# Patient Record
Sex: MALE | Race: WHITE
[De-identification: names, ages, dates, MRNs, and addresses within clinical notes are randomized per-mention and may not be internally consistent; named-entity substitution may affect disease eponyms.]

---

## 2018-08-21 ENCOUNTER — HOSPITAL ENCOUNTER (EMERGENCY)
Dept: HOSPITAL 15 - ER | Age: 68
Discharge: ANOTHER HOSPITAL | End: 2018-08-21
Payer: MEDICARE

## 2018-08-21 VITALS — HEIGHT: 72 IN | BODY MASS INDEX: 28.44 KG/M2 | WEIGHT: 210 LBS

## 2018-08-21 VITALS — SYSTOLIC BLOOD PRESSURE: 162 MMHG | DIASTOLIC BLOOD PRESSURE: 98 MMHG

## 2018-08-21 DIAGNOSIS — F10.10: ICD-10-CM
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
## 2018-08-21 DIAGNOSIS — J44.9: ICD-10-CM

## 2018-08-21 DIAGNOSIS — S12.110A: Primary | ICD-10-CM
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
## 2018-08-21 LAB
ALBUMIN SERPL-MCNC: 3.4 G/DL (ref 3.4–5)
ALP SERPL-CCNC: 55 U/L (ref 45–117)
ALT SERPL-CCNC: 90 U/L (ref 16–61)
ANION GAP SERPL CALCULATED.3IONS-SCNC: 12 MMOL/L (ref 5–15)
APTT PPP: 26 SEC (ref 23.78–33.04)
BILIRUB SERPL-MCNC: 0.6 MG/DL (ref 0.2–1)
BUN SERPL-MCNC: 13 MG/DL (ref 7–18)
BUN/CREAT SERPL: 14.6
CALCIUM SERPL-MCNC: 8.7 MG/DL (ref 8.5–10.1)
CHLORIDE SERPL-SCNC: 108 MMOL/L (ref 98–107)
CO2 SERPL-SCNC: 25 MMOL/L (ref 21–32)
GLUCOSE SERPL-MCNC: 104 MG/DL (ref 74–106)
HCT VFR BLD AUTO: 46.5 % (ref 41–53)
HGB BLD-MCNC: 15.9 G/DL (ref 13.5–17.5)
INR PPP: 0.94 (ref 0.9–1.15)
MAGNESIUM SERPL-MCNC: 1.8 MG/DL (ref 1.6–2.6)
MCH RBC QN AUTO: 37.9 PG (ref 28–32)
MCV RBC AUTO: 110.8 FL (ref 80–100)
NRBC BLD QL AUTO: 0.2 %
POTASSIUM SERPL-SCNC: 3.3 MMOL/L (ref 3.5–5.1)
PROT SERPL-MCNC: 6.9 G/DL (ref 6.4–8.2)
PROTHROMBIN TIME: 10.1 SEC (ref 9.27–12.13)
SODIUM SERPL-SCNC: 145 MMOL/L (ref 136–145)

## 2018-08-21 PROCEDURE — 96375 TX/PRO/DX INJ NEW DRUG ADDON: CPT

## 2018-08-21 PROCEDURE — 80320 DRUG SCREEN QUANTALCOHOLS: CPT

## 2018-08-21 PROCEDURE — 70450 CT HEAD/BRAIN W/O DYE: CPT

## 2018-08-21 PROCEDURE — 83880 ASSAY OF NATRIURETIC PEPTIDE: CPT

## 2018-08-21 PROCEDURE — 72125 CT NECK SPINE W/O DYE: CPT

## 2018-08-21 PROCEDURE — 83735 ASSAY OF MAGNESIUM: CPT

## 2018-08-21 PROCEDURE — 85025 COMPLETE CBC W/AUTO DIFF WBC: CPT

## 2018-08-21 PROCEDURE — 36415 COLL VENOUS BLD VENIPUNCTURE: CPT

## 2018-08-21 PROCEDURE — 85610 PROTHROMBIN TIME: CPT

## 2018-08-21 PROCEDURE — 99285 EMERGENCY DEPT VISIT HI MDM: CPT

## 2018-08-21 PROCEDURE — 85379 FIBRIN DEGRADATION QUANT: CPT

## 2018-08-21 PROCEDURE — 84484 ASSAY OF TROPONIN QUANT: CPT

## 2018-08-21 PROCEDURE — 96374 THER/PROPH/DIAG INJ IV PUSH: CPT

## 2018-08-21 PROCEDURE — 85730 THROMBOPLASTIN TIME PARTIAL: CPT

## 2018-08-21 PROCEDURE — 96372 THER/PROPH/DIAG INJ SC/IM: CPT

## 2018-08-21 PROCEDURE — 80053 COMPREHEN METABOLIC PANEL: CPT

## 2018-08-21 PROCEDURE — 71045 X-RAY EXAM CHEST 1 VIEW: CPT

## 2019-10-25 ENCOUNTER — HOSPITAL ENCOUNTER (INPATIENT)
Dept: HOSPITAL 15 - ER | Age: 69
LOS: 1 days | Discharge: HOME | DRG: 202 | End: 2019-10-26
Attending: INTERNAL MEDICINE | Admitting: INTERNAL MEDICINE
Payer: COMMERCIAL

## 2019-10-25 VITALS — SYSTOLIC BLOOD PRESSURE: 164 MMHG | DIASTOLIC BLOOD PRESSURE: 83 MMHG

## 2019-10-25 VITALS — BODY MASS INDEX: 28.43 KG/M2 | WEIGHT: 209.88 LBS | HEIGHT: 72 IN

## 2019-10-25 VITALS — SYSTOLIC BLOOD PRESSURE: 178 MMHG | DIASTOLIC BLOOD PRESSURE: 95 MMHG

## 2019-10-25 VITALS — DIASTOLIC BLOOD PRESSURE: 82 MMHG | SYSTOLIC BLOOD PRESSURE: 157 MMHG

## 2019-10-25 DIAGNOSIS — J20.9: Primary | ICD-10-CM

## 2019-10-25 DIAGNOSIS — J44.0: ICD-10-CM

## 2019-10-25 DIAGNOSIS — Z86.73: ICD-10-CM

## 2019-10-25 DIAGNOSIS — E66.3: ICD-10-CM

## 2019-10-25 DIAGNOSIS — F17.210: ICD-10-CM

## 2019-10-25 DIAGNOSIS — I50.9: ICD-10-CM

## 2019-10-25 DIAGNOSIS — J44.1: ICD-10-CM

## 2019-10-25 DIAGNOSIS — H35.30: ICD-10-CM

## 2019-10-25 DIAGNOSIS — Z79.899: ICD-10-CM

## 2019-10-25 LAB
ALBUMIN SERPL-MCNC: 3.9 G/DL (ref 3.4–5)
ALP SERPL-CCNC: 69 U/L (ref 45–117)
ALT SERPL-CCNC: 29 U/L (ref 16–61)
ANION GAP SERPL CALCULATED.3IONS-SCNC: 5 MMOL/L (ref 5–15)
BILIRUB SERPL-MCNC: 0.5 MG/DL (ref 0.2–1)
BUN SERPL-MCNC: 26 MG/DL (ref 7–18)
BUN/CREAT SERPL: 19.8
CALCIUM SERPL-MCNC: 8.5 MG/DL (ref 8.5–10.1)
CHLORIDE SERPL-SCNC: 104 MMOL/L (ref 98–107)
CO2 SERPL-SCNC: 31 MMOL/L (ref 21–32)
GLUCOSE SERPL-MCNC: 127 MG/DL (ref 74–106)
HCT VFR BLD AUTO: 51.4 % (ref 41–53)
HGB BLD-MCNC: 17.1 G/DL (ref 13.5–17.5)
MCH RBC QN AUTO: 36.2 PG (ref 28–32)
MCV RBC AUTO: 108.9 FL (ref 80–100)
NRBC BLD QL AUTO: 0.1 %
POTASSIUM SERPL-SCNC: 4.5 MMOL/L (ref 3.5–5.1)
PROT SERPL-MCNC: 7.6 G/DL (ref 6.4–8.2)
SODIUM SERPL-SCNC: 140 MMOL/L (ref 136–145)

## 2019-10-25 PROCEDURE — 96365 THER/PROPH/DIAG IV INF INIT: CPT

## 2019-10-25 PROCEDURE — 83880 ASSAY OF NATRIURETIC PEPTIDE: CPT

## 2019-10-25 PROCEDURE — 81001 URINALYSIS AUTO W/SCOPE: CPT

## 2019-10-25 PROCEDURE — 80053 COMPREHEN METABOLIC PANEL: CPT

## 2019-10-25 PROCEDURE — 71045 X-RAY EXAM CHEST 1 VIEW: CPT

## 2019-10-25 PROCEDURE — 94640 AIRWAY INHALATION TREATMENT: CPT

## 2019-10-25 PROCEDURE — 82550 ASSAY OF CK (CPK): CPT

## 2019-10-25 PROCEDURE — 36415 COLL VENOUS BLD VENIPUNCTURE: CPT

## 2019-10-25 PROCEDURE — 85025 COMPLETE CBC W/AUTO DIFF WBC: CPT

## 2019-10-25 PROCEDURE — 84484 ASSAY OF TROPONIN QUANT: CPT

## 2019-10-25 PROCEDURE — 93005 ELECTROCARDIOGRAM TRACING: CPT

## 2019-10-25 PROCEDURE — 96375 TX/PRO/DX INJ NEW DRUG ADDON: CPT

## 2019-10-25 RX ADMIN — LEVOFLOXACIN SCH MLS/HR: 5 INJECTION, SOLUTION INTRAVENOUS at 17:28

## 2019-10-25 RX ADMIN — CARVEDILOL SCH MG: 3.12 TABLET, FILM COATED ORAL at 21:48

## 2019-10-25 RX ADMIN — METHYLPREDNISOLONE SODIUM SUCCINATE SCH MG: 40 INJECTION, POWDER, FOR SOLUTION INTRAMUSCULAR; INTRAVENOUS at 21:49

## 2019-10-25 RX ADMIN — FUROSEMIDE SCH MG: 10 INJECTION, SOLUTION INTRAMUSCULAR; INTRAVENOUS at 17:45

## 2019-10-25 RX ADMIN — ALBUTEROL SULFATE SCH MG: 2.5 SOLUTION RESPIRATORY (INHALATION) at 19:26

## 2019-10-25 RX ADMIN — NITROGLYCERIN SCH PATCH: 0.2 PATCH TRANSDERMAL at 17:45

## 2019-10-25 RX ADMIN — IPRATROPIUM BROMIDE SCH MG: 0.5 SOLUTION RESPIRATORY (INHALATION) at 19:27

## 2019-10-25 RX ADMIN — ENALAPRIL MALEATE SCH MG: 2.5 TABLET ORAL at 17:45

## 2019-10-25 RX ADMIN — POTASSIUM CHLORIDE SCH MEQ: 1500 TABLET, EXTENDED RELEASE ORAL at 17:45

## 2019-10-25 NOTE — NUR
RT NOTE

PT WAS SEEN BY RT FOR HHN TX. PT TOLERATES WELL  VIA MASK. NO ADVERSE REACTION NOTED. CONT 
AS ORDERED

-------------------------------------------------------------------------------

Addendum: 10/25/19 at 1929 by Marie Kemp RT

-------------------------------------------------------------------------------

Amended: Links added.

## 2019-10-25 NOTE — NUR
Telemetry admit from JASMINE DANIELSMILENA admitted to Telemetry unit after SBAR received.  Patient oriented to RENALDO PALAFOX, primary RN, unit, room, bed, and unit policies regarding patient care and 
visiting hours. Patient now on continuous telemetry monitoring, tele box # 61. Patient 
placed on bedside oxygen 3 L NC, and encouraged to call if they need something. All 
questions and concerns addressed, patient verbalized understanding. 

Vitals: /95, HR 76, RR 20, 02 96, 98.2, 0/10 P. Patient reports no sob at this time, 
will continue to monitor.


-------------------------------------------------------------------------------

Addendum: 10/26/19 at 0714 by RENALDO PALAFOX RN

-------------------------------------------------------------------------------

Pt reports he has not had flu or pneumonia vaccine in the last 5 years.

## 2019-10-25 NOTE — NUR
ENDORSED CARE TO NIGHT MAGDY ARIAS. JUANA NOTIFIED OF PT BP, URINALYSIS AND RESPIRATORY 
CULTURE NEEDED, AND LEVAQUIN NEEDS TO BE RESTARTED.

## 2019-10-26 VITALS — SYSTOLIC BLOOD PRESSURE: 146 MMHG | DIASTOLIC BLOOD PRESSURE: 77 MMHG

## 2019-10-26 VITALS — SYSTOLIC BLOOD PRESSURE: 163 MMHG | DIASTOLIC BLOOD PRESSURE: 94 MMHG

## 2019-10-26 VITALS — DIASTOLIC BLOOD PRESSURE: 112 MMHG | SYSTOLIC BLOOD PRESSURE: 129 MMHG

## 2019-10-26 RX ADMIN — LEVOFLOXACIN SCH MLS/HR: 5 INJECTION, SOLUTION INTRAVENOUS at 10:00

## 2019-10-26 RX ADMIN — ALBUTEROL SULFATE SCH MG: 2.5 SOLUTION RESPIRATORY (INHALATION) at 06:24

## 2019-10-26 RX ADMIN — IPRATROPIUM BROMIDE SCH MG: 0.5 SOLUTION RESPIRATORY (INHALATION) at 06:24

## 2019-10-26 RX ADMIN — POTASSIUM CHLORIDE SCH MEQ: 1500 TABLET, EXTENDED RELEASE ORAL at 10:07

## 2019-10-26 RX ADMIN — NITROGLYCERIN SCH PATCH: 0.2 PATCH TRANSDERMAL at 10:09

## 2019-10-26 RX ADMIN — FUROSEMIDE SCH MG: 10 INJECTION, SOLUTION INTRAMUSCULAR; INTRAVENOUS at 10:00

## 2019-10-26 RX ADMIN — METHYLPREDNISOLONE SODIUM SUCCINATE SCH MG: 40 INJECTION, POWDER, FOR SOLUTION INTRAMUSCULAR; INTRAVENOUS at 10:00

## 2019-10-26 RX ADMIN — ENALAPRIL MALEATE SCH MG: 2.5 TABLET ORAL at 10:07

## 2019-10-26 RX ADMIN — CARVEDILOL SCH MG: 3.12 TABLET, FILM COATED ORAL at 10:07

## 2019-10-26 NOTE — NUR
Discharge instructions given as ordered. Encourage to follow up with PMD as instructed. All 
questions and concerns addressed. Patient verbalized understanding.  Medication 
reconciliation form completed and copy given to patient. Needed vaccines given. IV removed 
with catheter intact, pressure dressing applied.  Telemetry unit returned to ICU. Patient 
taken to vehicle via wheelchair with all personal belongings, accompanied by staff. No 
distress noted at time of departure. 

-------------------------------------------------------------------------------

Addendum: 10/26/19 at 1300 by RENALDO PALAFOX RN

-------------------------------------------------------------------------------

Pt refused social service consult. He reports he is in a hurry to go.

## 2019-10-26 NOTE — NUR
RT NOTE

PT WAS SEEN BY RT FOR HHN TX. PT WAS SLEEPING BUT EASILY AWAKENED. PT STATES NO TREATMENT IS 
NEEDED AT THIS TIME AND WILL TAKE ONE AGAIN IN  THE MORNING. SITTER AT BEDSIDE WILL CALL IF 
TX NEEDED. HR 86, RR 16, BS CLEAR/DIM, POX 94% ON 2L NASAL CANNULA. MAGDY ARIAS NOTIFIED AND 
WILL CALL IF TX NEEDED BEFORE NEXT ROUNDS

-------------------------------------------------------------------------------

Addendum: 10/26/19 at 0056 by Marie Kemp RT

-------------------------------------------------------------------------------

Amended: Links added.

## 2019-10-26 NOTE — NUR
Pt reports he has to leave by 11 am. Pt reports he will not stay any longer. He reports,"I 
have to try and stop my house from foreclosing today, I have until 4 pm." Pt requesting taxi 
voucher. Encouraged pt to stay and wait for doctor. Pt refusing. Dr. Michael notified and 
aware. Called house supervisor, no answer, will try again.

## 2019-10-26 NOTE — NUR
PT RESTING IN BED, NO DISTRESS NOTED. PT UPDATED ON PLAN OF CARE. PT REPORTS NO PAIN OR SOB 
AT THIS TIME. RESTARTED FLUID, IV BEGAN LEAKING. IV DC'D AND PRESSURE DRESSING APPLIED. PT 
REPORTS HE DOES NOT WANT ANOTHER IV. PT REPORTS HE IS LEAVING TODAY, "NO MATTER WHAT." HE 
REPORTS, HE "HAS THINGS TO DO AT HOME." EDUCATED PATIENT HE NEEDS AN ABX IV THIS MORNING, PT 
REFUSED IV AGAIN. WILL REQUEST PO ABX FROM MD. PT ENCOURAGED TO USE CALL LIGHT PRN, WILL 
CONTINUE TO MONITOR.

## 2019-10-26 NOTE — NUR
DR REVELES SAW PATIENT AND DISCUSSED POC. NEW ORDERS FOR MEDICATIONS AND DISCHARGE. CALLED 
HOUSE SUP ELMO AND REQUESTED TAXI VOUCHER. ELMO REPORTS PT CAN HAVE A BUS PASS. WILL 
CONTINUE TO MONITOR.

## 2019-11-24 ENCOUNTER — HOSPITAL ENCOUNTER (INPATIENT)
Dept: HOSPITAL 15 - ER | Age: 69
LOS: 2 days | Discharge: HOME | DRG: 202 | End: 2019-11-26
Attending: INTERNAL MEDICINE | Admitting: HOSPITALIST
Payer: COMMERCIAL

## 2019-11-24 VITALS — WEIGHT: 156.97 LBS | HEIGHT: 72 IN | BODY MASS INDEX: 21.26 KG/M2

## 2019-11-24 DIAGNOSIS — J44.0: ICD-10-CM

## 2019-11-24 DIAGNOSIS — J45.901: ICD-10-CM

## 2019-11-24 DIAGNOSIS — J20.9: Primary | ICD-10-CM

## 2019-11-24 DIAGNOSIS — I50.9: ICD-10-CM

## 2019-11-24 DIAGNOSIS — F41.9: ICD-10-CM

## 2019-11-24 DIAGNOSIS — J44.1: ICD-10-CM

## 2019-11-24 DIAGNOSIS — F17.210: ICD-10-CM

## 2019-11-24 LAB
HCT VFR BLD AUTO: 48.3 % (ref 41–53)
HGB BLD-MCNC: 17 G/DL (ref 13.5–17.5)
MCH RBC QN AUTO: 36.4 PG (ref 28–32)
MCV RBC AUTO: 103.5 FL (ref 80–100)
NRBC BLD QL AUTO: 0.1 %

## 2019-11-24 PROCEDURE — 80048 BASIC METABOLIC PNL TOTAL CA: CPT

## 2019-11-24 PROCEDURE — 83735 ASSAY OF MAGNESIUM: CPT

## 2019-11-24 PROCEDURE — 85007 BL SMEAR W/DIFF WBC COUNT: CPT

## 2019-11-24 PROCEDURE — 96361 HYDRATE IV INFUSION ADD-ON: CPT

## 2019-11-24 PROCEDURE — 87081 CULTURE SCREEN ONLY: CPT

## 2019-11-24 PROCEDURE — 94640 AIRWAY INHALATION TREATMENT: CPT

## 2019-11-24 PROCEDURE — 70450 CT HEAD/BRAIN W/O DYE: CPT

## 2019-11-24 PROCEDURE — 85025 COMPLETE CBC W/AUTO DIFF WBC: CPT

## 2019-11-24 PROCEDURE — 97163 PT EVAL HIGH COMPLEX 45 MIN: CPT

## 2019-11-24 PROCEDURE — 81001 URINALYSIS AUTO W/SCOPE: CPT

## 2019-11-24 PROCEDURE — 36415 COLL VENOUS BLD VENIPUNCTURE: CPT

## 2019-11-24 PROCEDURE — 80320 DRUG SCREEN QUANTALCOHOLS: CPT

## 2019-11-24 PROCEDURE — 96374 THER/PROPH/DIAG INJ IV PUSH: CPT

## 2019-11-24 PROCEDURE — 93005 ELECTROCARDIOGRAM TRACING: CPT

## 2019-11-24 PROCEDURE — 80053 COMPREHEN METABOLIC PANEL: CPT

## 2019-11-24 PROCEDURE — 85027 COMPLETE CBC AUTOMATED: CPT

## 2019-11-24 PROCEDURE — 84484 ASSAY OF TROPONIN QUANT: CPT

## 2019-11-24 PROCEDURE — 71045 X-RAY EXAM CHEST 1 VIEW: CPT

## 2019-11-24 PROCEDURE — 83880 ASSAY OF NATRIURETIC PEPTIDE: CPT

## 2019-11-25 VITALS — SYSTOLIC BLOOD PRESSURE: 139 MMHG | DIASTOLIC BLOOD PRESSURE: 97 MMHG

## 2019-11-25 VITALS — SYSTOLIC BLOOD PRESSURE: 157 MMHG | DIASTOLIC BLOOD PRESSURE: 81 MMHG

## 2019-11-25 VITALS — SYSTOLIC BLOOD PRESSURE: 149 MMHG | DIASTOLIC BLOOD PRESSURE: 67 MMHG

## 2019-11-25 VITALS — SYSTOLIC BLOOD PRESSURE: 128 MMHG | DIASTOLIC BLOOD PRESSURE: 62 MMHG

## 2019-11-25 VITALS — DIASTOLIC BLOOD PRESSURE: 81 MMHG | SYSTOLIC BLOOD PRESSURE: 149 MMHG

## 2019-11-25 VITALS — SYSTOLIC BLOOD PRESSURE: 153 MMHG | DIASTOLIC BLOOD PRESSURE: 85 MMHG

## 2019-11-25 LAB
ALBUMIN SERPL-MCNC: 3.4 G/DL (ref 3.4–5)
ALP SERPL-CCNC: 64 U/L (ref 45–117)
ALT SERPL-CCNC: 21 U/L (ref 16–61)
ANION GAP SERPL CALCULATED.3IONS-SCNC: 4 MMOL/L (ref 5–15)
ANION GAP SERPL CALCULATED.3IONS-SCNC: 5 MMOL/L (ref 5–15)
BILIRUB SERPL-MCNC: 0.4 MG/DL (ref 0.2–1)
BUN SERPL-MCNC: 24 MG/DL (ref 7–18)
BUN SERPL-MCNC: 28 MG/DL (ref 7–18)
BUN/CREAT SERPL: 21.4
BUN/CREAT SERPL: 24.1
CALCIUM SERPL-MCNC: 8.4 MG/DL (ref 8.5–10.1)
CALCIUM SERPL-MCNC: 8.7 MG/DL (ref 8.5–10.1)
CHLORIDE SERPL-SCNC: 100 MMOL/L (ref 98–107)
CHLORIDE SERPL-SCNC: 104 MMOL/L (ref 98–107)
CO2 SERPL-SCNC: 29 MMOL/L (ref 21–32)
CO2 SERPL-SCNC: 35 MMOL/L (ref 21–32)
GLUCOSE SERPL-MCNC: 111 MG/DL (ref 74–106)
GLUCOSE SERPL-MCNC: 254 MG/DL (ref 74–106)
HCT VFR BLD AUTO: 47.8 % (ref 41–53)
HGB BLD-MCNC: 16.7 G/DL (ref 13.5–17.5)
MCH RBC QN AUTO: 36.8 PG (ref 28–32)
MCV RBC AUTO: 104.9 FL (ref 80–100)
NRBC BLD QL AUTO: 0 %
POTASSIUM SERPL-SCNC: 3.9 MMOL/L (ref 3.5–5.1)
POTASSIUM SERPL-SCNC: 4.3 MMOL/L (ref 3.5–5.1)
PROT SERPL-MCNC: 6.9 G/DL (ref 6.4–8.2)
SODIUM SERPL-SCNC: 138 MMOL/L (ref 136–145)
SODIUM SERPL-SCNC: 139 MMOL/L (ref 136–145)

## 2019-11-25 RX ADMIN — SODIUM CHLORIDE SCH MLS/HR: 0.9 INJECTION, SOLUTION INTRAVENOUS at 01:29

## 2019-11-25 RX ADMIN — LEVOFLOXACIN SCH MLS/HR: 5 INJECTION, SOLUTION INTRAVENOUS at 16:01

## 2019-11-25 RX ADMIN — ALBUTEROL SULFATE SCH MG: 2.5 SOLUTION RESPIRATORY (INHALATION) at 18:16

## 2019-11-25 RX ADMIN — METHYLPREDNISOLONE SODIUM SUCCINATE SCH MG: 40 INJECTION, POWDER, FOR SOLUTION INTRAMUSCULAR; INTRAVENOUS at 13:50

## 2019-11-25 RX ADMIN — IPRATROPIUM BROMIDE SCH MG: 0.5 SOLUTION RESPIRATORY (INHALATION) at 18:15

## 2019-11-25 RX ADMIN — IPRATROPIUM BROMIDE SCH MG: 0.5 SOLUTION RESPIRATORY (INHALATION) at 22:00

## 2019-11-25 RX ADMIN — IPRATROPIUM BROMIDE SCH MG: 0.5 SOLUTION RESPIRATORY (INHALATION) at 15:11

## 2019-11-25 RX ADMIN — ALBUTEROL SULFATE SCH MG: 2.5 SOLUTION RESPIRATORY (INHALATION) at 15:11

## 2019-11-25 RX ADMIN — SODIUM CHLORIDE SCH MLS/HR: 0.9 INJECTION, SOLUTION INTRAVENOUS at 17:19

## 2019-11-25 RX ADMIN — METHYLPREDNISOLONE SODIUM SUCCINATE SCH MG: 40 INJECTION, POWDER, FOR SOLUTION INTRAMUSCULAR; INTRAVENOUS at 21:37

## 2019-11-25 RX ADMIN — METHYLPREDNISOLONE SODIUM SUCCINATE SCH MG: 40 INJECTION, POWDER, FOR SOLUTION INTRAMUSCULAR; INTRAVENOUS at 05:30

## 2019-11-25 RX ADMIN — ALBUTEROL SULFATE SCH MG: 2.5 SOLUTION RESPIRATORY (INHALATION) at 22:00

## 2019-11-26 VITALS — DIASTOLIC BLOOD PRESSURE: 89 MMHG | SYSTOLIC BLOOD PRESSURE: 158 MMHG

## 2019-11-26 VITALS — SYSTOLIC BLOOD PRESSURE: 146 MMHG | DIASTOLIC BLOOD PRESSURE: 88 MMHG

## 2019-11-26 VITALS — DIASTOLIC BLOOD PRESSURE: 79 MMHG | SYSTOLIC BLOOD PRESSURE: 156 MMHG

## 2019-11-26 LAB
ANION GAP SERPL CALCULATED.3IONS-SCNC: 5 MMOL/L (ref 5–15)
BUN SERPL-MCNC: 33 MG/DL (ref 7–18)
BUN/CREAT SERPL: 27.3
CALCIUM SERPL-MCNC: 8.1 MG/DL (ref 8.5–10.1)
CHLORIDE SERPL-SCNC: 106 MMOL/L (ref 98–107)
CO2 SERPL-SCNC: 29 MMOL/L (ref 21–32)
GLUCOSE SERPL-MCNC: 140 MG/DL (ref 74–106)
HCT VFR BLD AUTO: 45.3 % (ref 41–53)
HGB BLD-MCNC: 15.3 G/DL (ref 13.5–17.5)
MAGNESIUM SERPL-MCNC: 2.3 MG/DL (ref 1.6–2.6)
MCH RBC QN AUTO: 36.2 PG (ref 28–32)
MCV RBC AUTO: 107.1 FL (ref 80–100)
POTASSIUM SERPL-SCNC: 4.3 MMOL/L (ref 3.5–5.1)
SODIUM SERPL-SCNC: 140 MMOL/L (ref 136–145)

## 2019-11-26 RX ADMIN — IPRATROPIUM BROMIDE SCH MG: 0.5 SOLUTION RESPIRATORY (INHALATION) at 09:40

## 2019-11-26 RX ADMIN — ALBUTEROL SULFATE SCH MG: 2.5 SOLUTION RESPIRATORY (INHALATION) at 05:46

## 2019-11-26 RX ADMIN — METHYLPREDNISOLONE SODIUM SUCCINATE SCH MG: 40 INJECTION, POWDER, FOR SOLUTION INTRAMUSCULAR; INTRAVENOUS at 05:44

## 2019-11-26 RX ADMIN — ALBUTEROL SULFATE SCH MG: 2.5 SOLUTION RESPIRATORY (INHALATION) at 09:40

## 2019-11-26 RX ADMIN — SODIUM CHLORIDE SCH MLS/HR: 0.9 INJECTION, SOLUTION INTRAVENOUS at 10:04

## 2019-11-26 RX ADMIN — LEVOFLOXACIN SCH MLS/HR: 5 INJECTION, SOLUTION INTRAVENOUS at 09:22

## 2019-11-26 RX ADMIN — IPRATROPIUM BROMIDE SCH MG: 0.5 SOLUTION RESPIRATORY (INHALATION) at 05:46

## 2019-12-07 ENCOUNTER — HOSPITAL ENCOUNTER (INPATIENT)
Dept: HOSPITAL 15 - ER | Age: 69
LOS: 2 days | Discharge: LEFT BEFORE DISCHARGE | DRG: 189 | End: 2019-12-09
Attending: INTERNAL MEDICINE | Admitting: INTERNAL MEDICINE
Payer: COMMERCIAL

## 2019-12-07 VITALS — DIASTOLIC BLOOD PRESSURE: 104 MMHG | SYSTOLIC BLOOD PRESSURE: 150 MMHG

## 2019-12-07 VITALS — DIASTOLIC BLOOD PRESSURE: 76 MMHG | SYSTOLIC BLOOD PRESSURE: 130 MMHG

## 2019-12-07 VITALS — WEIGHT: 224.21 LBS | HEIGHT: 72 IN | BODY MASS INDEX: 30.37 KG/M2

## 2019-12-07 DIAGNOSIS — Z53.29: ICD-10-CM

## 2019-12-07 DIAGNOSIS — I49.3: ICD-10-CM

## 2019-12-07 DIAGNOSIS — J96.01: Primary | ICD-10-CM

## 2019-12-07 DIAGNOSIS — D75.89: ICD-10-CM

## 2019-12-07 DIAGNOSIS — E44.1: ICD-10-CM

## 2019-12-07 DIAGNOSIS — F17.210: ICD-10-CM

## 2019-12-07 DIAGNOSIS — Z86.73: ICD-10-CM

## 2019-12-07 DIAGNOSIS — R73.9: ICD-10-CM

## 2019-12-07 DIAGNOSIS — I10: ICD-10-CM

## 2019-12-07 DIAGNOSIS — Z79.899: ICD-10-CM

## 2019-12-07 DIAGNOSIS — J43.9: ICD-10-CM

## 2019-12-07 DIAGNOSIS — Z71.6: ICD-10-CM

## 2019-12-07 DIAGNOSIS — F10.10: ICD-10-CM
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
## 2019-12-07 DIAGNOSIS — J98.11: ICD-10-CM

## 2019-12-07 LAB
ALBUMIN SERPL-MCNC: 3.2 G/DL (ref 3.4–5)
ALP SERPL-CCNC: 60 U/L (ref 45–117)
ALT SERPL-CCNC: 22 U/L (ref 16–61)
ANION GAP SERPL CALCULATED.3IONS-SCNC: 8 MMOL/L (ref 5–15)
APTT PPP: 28.2 SEC (ref 23.64–32.05)
BILIRUB SERPL-MCNC: 0.6 MG/DL (ref 0.2–1)
BUN SERPL-MCNC: 21 MG/DL (ref 7–18)
BUN/CREAT SERPL: 15.4
CALCIUM SERPL-MCNC: 8.4 MG/DL (ref 8.5–10.1)
CHLORIDE SERPL-SCNC: 105 MMOL/L (ref 98–107)
CO2 SERPL-SCNC: 27 MMOL/L (ref 21–32)
GLUCOSE SERPL-MCNC: 136 MG/DL (ref 74–106)
HCT VFR BLD AUTO: 48.2 % (ref 41–53)
HGB BLD-MCNC: 16.6 G/DL (ref 13.5–17.5)
INR PPP: 1 (ref 0.9–1.15)
MCH RBC QN AUTO: 36.5 PG (ref 28–32)
MCV RBC AUTO: 106.1 FL (ref 80–100)
NRBC BLD QL AUTO: 0 %
POTASSIUM SERPL-SCNC: 4.2 MMOL/L (ref 3.5–5.1)
PROT SERPL-MCNC: 7 G/DL (ref 6.4–8.2)
SODIUM SERPL-SCNC: 140 MMOL/L (ref 136–145)

## 2019-12-07 PROCEDURE — 81001 URINALYSIS AUTO W/SCOPE: CPT

## 2019-12-07 PROCEDURE — 85379 FIBRIN DEGRADATION QUANT: CPT

## 2019-12-07 PROCEDURE — 85025 COMPLETE CBC W/AUTO DIFF WBC: CPT

## 2019-12-07 PROCEDURE — 85610 PROTHROMBIN TIME: CPT

## 2019-12-07 PROCEDURE — 96365 THER/PROPH/DIAG IV INF INIT: CPT

## 2019-12-07 PROCEDURE — 93306 TTE W/DOPPLER COMPLETE: CPT

## 2019-12-07 PROCEDURE — 71045 X-RAY EXAM CHEST 1 VIEW: CPT

## 2019-12-07 PROCEDURE — 71046 X-RAY EXAM CHEST 2 VIEWS: CPT

## 2019-12-07 PROCEDURE — 87804 INFLUENZA ASSAY W/OPTIC: CPT

## 2019-12-07 PROCEDURE — 87205 SMEAR GRAM STAIN: CPT

## 2019-12-07 PROCEDURE — 84443 ASSAY THYROID STIM HORMONE: CPT

## 2019-12-07 PROCEDURE — 87070 CULTURE OTHR SPECIMN AEROBIC: CPT

## 2019-12-07 PROCEDURE — 84484 ASSAY OF TROPONIN QUANT: CPT

## 2019-12-07 PROCEDURE — 87040 BLOOD CULTURE FOR BACTERIA: CPT

## 2019-12-07 PROCEDURE — 80053 COMPREHEN METABOLIC PANEL: CPT

## 2019-12-07 PROCEDURE — 80048 BASIC METABOLIC PNL TOTAL CA: CPT

## 2019-12-07 PROCEDURE — 36415 COLL VENOUS BLD VENIPUNCTURE: CPT

## 2019-12-07 PROCEDURE — 94640 AIRWAY INHALATION TREATMENT: CPT

## 2019-12-07 PROCEDURE — 83735 ASSAY OF MAGNESIUM: CPT

## 2019-12-07 PROCEDURE — 85730 THROMBOPLASTIN TIME PARTIAL: CPT

## 2019-12-07 PROCEDURE — 96375 TX/PRO/DX INJ NEW DRUG ADDON: CPT

## 2019-12-07 PROCEDURE — 87081 CULTURE SCREEN ONLY: CPT

## 2019-12-07 PROCEDURE — 71275 CT ANGIOGRAPHY CHEST: CPT

## 2019-12-07 RX ADMIN — LEVOFLOXACIN SCH MLS/HR: 5 INJECTION, SOLUTION INTRAVENOUS at 14:02

## 2019-12-07 RX ADMIN — ENOXAPARIN SODIUM SCH MG: 40 INJECTION SUBCUTANEOUS at 13:37

## 2019-12-07 RX ADMIN — LEVALBUTEROL SCH MG: 1.25 SOLUTION RESPIRATORY (INHALATION) at 19:32

## 2019-12-07 RX ADMIN — IPRATROPIUM BROMIDE PRN MG: 0.5 SOLUTION RESPIRATORY (INHALATION) at 19:33

## 2019-12-07 RX ADMIN — METHYLPREDNISOLONE SODIUM SUCCINATE SCH MG: 40 INJECTION, POWDER, FOR SOLUTION INTRAMUSCULAR; INTRAVENOUS at 17:30

## 2019-12-07 RX ADMIN — GABAPENTIN SCH MG: 100 CAPSULE ORAL at 21:56

## 2019-12-07 NOTE — NUR
Telemetry admit from MILENA HAMMOND admitted to Telemetry unit after SBAR received.  Patient oriented to GALINA COLON RN primary RN, unit, room, bed, and unit policies regarding patient care and 
visiting hours. Patient now on continuous telemetry monitoring, tele box #58 and telemetry 
reading on arrival to unit is SR. Patient placed on bedside oxygen, weighed by bedscale and 
encouraged to call if they need something. All questions and concerns addressed, patient 
verbalized understanding.

## 2019-12-08 VITALS — SYSTOLIC BLOOD PRESSURE: 155 MMHG | DIASTOLIC BLOOD PRESSURE: 70 MMHG

## 2019-12-08 VITALS — DIASTOLIC BLOOD PRESSURE: 71 MMHG | SYSTOLIC BLOOD PRESSURE: 134 MMHG

## 2019-12-08 VITALS — SYSTOLIC BLOOD PRESSURE: 125 MMHG | DIASTOLIC BLOOD PRESSURE: 70 MMHG

## 2019-12-08 VITALS — DIASTOLIC BLOOD PRESSURE: 78 MMHG | SYSTOLIC BLOOD PRESSURE: 127 MMHG

## 2019-12-08 VITALS — DIASTOLIC BLOOD PRESSURE: 87 MMHG | SYSTOLIC BLOOD PRESSURE: 127 MMHG

## 2019-12-08 LAB
ANION GAP SERPL CALCULATED.3IONS-SCNC: 8 MMOL/L (ref 5–15)
BUN SERPL-MCNC: 25 MG/DL (ref 7–18)
BUN/CREAT SERPL: 20.3
CALCIUM SERPL-MCNC: 7.7 MG/DL (ref 8.5–10.1)
CHLORIDE SERPL-SCNC: 106 MMOL/L (ref 98–107)
CO2 SERPL-SCNC: 26 MMOL/L (ref 21–32)
GLUCOSE SERPL-MCNC: 255 MG/DL (ref 74–106)
HCT VFR BLD AUTO: 41.8 % (ref 41–53)
HGB BLD-MCNC: 14.3 G/DL (ref 13.5–17.5)
MCH RBC QN AUTO: 36 PG (ref 28–32)
MCV RBC AUTO: 105.1 FL (ref 80–100)
NRBC BLD QL AUTO: 0.1 %
POTASSIUM SERPL-SCNC: 4.3 MMOL/L (ref 3.5–5.1)
SODIUM SERPL-SCNC: 140 MMOL/L (ref 136–145)

## 2019-12-08 RX ADMIN — LEVALBUTEROL SCH MG: 1.25 SOLUTION RESPIRATORY (INHALATION) at 00:00

## 2019-12-08 RX ADMIN — LEVALBUTEROL SCH MG: 1.25 SOLUTION RESPIRATORY (INHALATION) at 06:33

## 2019-12-08 RX ADMIN — METHYLPREDNISOLONE SODIUM SUCCINATE SCH MG: 40 INJECTION, POWDER, FOR SOLUTION INTRAMUSCULAR; INTRAVENOUS at 08:56

## 2019-12-08 RX ADMIN — METHYLPREDNISOLONE SODIUM SUCCINATE SCH MG: 40 INJECTION, POWDER, FOR SOLUTION INTRAMUSCULAR; INTRAVENOUS at 00:36

## 2019-12-08 RX ADMIN — IPRATROPIUM BROMIDE PRN MG: 0.5 SOLUTION RESPIRATORY (INHALATION) at 06:33

## 2019-12-08 RX ADMIN — ENOXAPARIN SODIUM SCH MG: 40 INJECTION SUBCUTANEOUS at 08:57

## 2019-12-08 RX ADMIN — LEVOFLOXACIN SCH MLS/HR: 5 INJECTION, SOLUTION INTRAVENOUS at 08:56

## 2019-12-08 RX ADMIN — IPRATROPIUM BROMIDE PRN MG: 0.5 SOLUTION RESPIRATORY (INHALATION) at 11:37

## 2019-12-08 RX ADMIN — PANTOPRAZOLE SODIUM SCH MG: 40 TABLET, DELAYED RELEASE ORAL at 08:57

## 2019-12-08 RX ADMIN — LEVALBUTEROL SCH MG: 1.25 SOLUTION RESPIRATORY (INHALATION) at 18:58

## 2019-12-08 RX ADMIN — LEVALBUTEROL SCH MG: 1.25 SOLUTION RESPIRATORY (INHALATION) at 11:37

## 2019-12-08 RX ADMIN — GABAPENTIN SCH MG: 100 CAPSULE ORAL at 14:07

## 2019-12-08 RX ADMIN — GABAPENTIN SCH MG: 100 CAPSULE ORAL at 05:32

## 2019-12-08 RX ADMIN — IPRATROPIUM BROMIDE PRN MG: 0.5 SOLUTION RESPIRATORY (INHALATION) at 03:37

## 2019-12-08 RX ADMIN — IPRATROPIUM BROMIDE PRN MG: 0.5 SOLUTION RESPIRATORY (INHALATION) at 18:58

## 2019-12-08 NOTE — NUR
Patient refused AM lab draw:

Patient expressed to RN that he was very upset that he could not sleep with the nurse and 
CNA rounding on patient and taking vital signs. Patient expressed that he does not want to 
be woken up again this morning and does not want blood work to be done until after 8am. RN 
apologized to patient and provided teaching to patient on care that is being provided. 
Patient verbalized understanding but still did not want his blood work until after 8am.

## 2019-12-08 NOTE — NUR
DR. LEO MEDINA AT BEDSIDE

THE PHYSICIAN SPOKE TO THE PATIENT REGARDING HIS USE OF ALCOHOL AND THAT HE WOULD BE 
PRESCRIBED MEDICATION TO HELP HIM WITH THE WITHDRAWAL SYMPTOMS. THE PATIENT STATED HE WOULD 
"LIKE TO TAKE CARE OF THE ALCOHOL ON HIS OWN." HE REFUSED THE MEDICATION TO HELP HIM WITH 
ANY WITHDRAWAL SYMPTOMS.

## 2019-12-08 NOTE — NUR
RN received call from monitor tech that patients Tele leads had to be fixed but patient is 
asleep and patient made it very clear to RN not to wake him up if he is asleep. Patient was 
very upset earlier and cursing but was able to be calmed down and returned to bed in a safe 
manner. RN will continue to assess patient and note when he is awake to properly adjust his 
Tele leads.

## 2019-12-08 NOTE — NUR
RT paged:

Patient complained of shortness of breath and difficulty breathing. RN paged respiratory 
therapy to administer PRN breathing treatment. RN will continue to monitor.

## 2019-12-09 VITALS — DIASTOLIC BLOOD PRESSURE: 90 MMHG | SYSTOLIC BLOOD PRESSURE: 158 MMHG

## 2019-12-09 VITALS — DIASTOLIC BLOOD PRESSURE: 68 MMHG | SYSTOLIC BLOOD PRESSURE: 161 MMHG

## 2019-12-09 VITALS — SYSTOLIC BLOOD PRESSURE: 125 MMHG | DIASTOLIC BLOOD PRESSURE: 64 MMHG

## 2019-12-09 LAB
ANION GAP SERPL CALCULATED.3IONS-SCNC: 6 MMOL/L (ref 5–15)
BUN SERPL-MCNC: 25 MG/DL (ref 7–18)
BUN/CREAT SERPL: 22.9
CALCIUM SERPL-MCNC: 8.6 MG/DL (ref 8.5–10.1)
CHLORIDE SERPL-SCNC: 108 MMOL/L (ref 98–107)
CO2 SERPL-SCNC: 29 MMOL/L (ref 21–32)
GLUCOSE SERPL-MCNC: 125 MG/DL (ref 74–106)
HCT VFR BLD AUTO: 44.3 % (ref 41–53)
HGB BLD-MCNC: 15 G/DL (ref 13.5–17.5)
MCH RBC QN AUTO: 35.7 PG (ref 28–32)
MCV RBC AUTO: 105.8 FL (ref 80–100)
NRBC BLD QL AUTO: 0.1 %
POTASSIUM SERPL-SCNC: 4.3 MMOL/L (ref 3.5–5.1)
SODIUM SERPL-SCNC: 143 MMOL/L (ref 136–145)

## 2019-12-09 RX ADMIN — LEVALBUTEROL SCH MG: 1.25 SOLUTION RESPIRATORY (INHALATION) at 00:48

## 2019-12-09 RX ADMIN — LEVOFLOXACIN SCH MLS/HR: 5 INJECTION, SOLUTION INTRAVENOUS at 10:37

## 2019-12-09 RX ADMIN — LEVALBUTEROL SCH MG: 1.25 SOLUTION RESPIRATORY (INHALATION) at 11:53

## 2019-12-09 RX ADMIN — LEVALBUTEROL SCH MG: 1.25 SOLUTION RESPIRATORY (INHALATION) at 06:53

## 2019-12-09 RX ADMIN — ENOXAPARIN SODIUM SCH MG: 40 INJECTION SUBCUTANEOUS at 10:37

## 2019-12-09 RX ADMIN — PANTOPRAZOLE SODIUM SCH MG: 40 TABLET, DELAYED RELEASE ORAL at 10:37

## 2019-12-09 NOTE — NUR
RN received call from MT that patient is off Tele. Patient verbalized he does not want to be 
bothered, patient told RN "Go away, the sight of you is pissing me off". RN allowed patient 
his space and time to calm down. RN to attempt to place Tele back on when patient calms 
down.

## 2019-12-09 NOTE — NUR
Received Social Service consult to see pt about his Alcohol Abuse. Unfortunately, the pt 
left the hospital prior to been seen. Pt signed out against medical advice.

## 2019-12-09 NOTE — NUR
Patient refused AM lab draw again:

Patient expressed to RN that he was very upset that he could not sleep with the nurse and 
CNA rounding on patient and taking vital signs just as last night. Patient expressed that he 
does not want to be woken up again this morning and does not want blood work to be done 
until after 8am as yesterday. RN apologized to patient and provided reinforced teaching to 
patient on care that is being provided. Patient verbalized understanding but still did not 
want his blood work until after 8am.

## 2019-12-09 NOTE — NUR
AMA Note



MILENA DANIELS states they want to leave the hospital Against Medical Advice (AMA).  Patient 
encouraged to stay for further treatment/stabilization.  LEO Awan MD notified of 
patient's wishes.  Patient advised of the risks and benefits  of leaving AMA.  Patient 
verbalized understanding.  Patient encouraged to return to the ER if symptoms do not improve 
or worsen.

## 2020-03-03 ENCOUNTER — HOSPITAL ENCOUNTER (EMERGENCY)
Dept: HOSPITAL 15 - ER | Age: 70
Discharge: HOME | End: 2020-03-03
Payer: COMMERCIAL

## 2020-03-03 VITALS — HEIGHT: 72 IN | BODY MASS INDEX: 30.48 KG/M2 | WEIGHT: 225 LBS

## 2020-03-03 VITALS — DIASTOLIC BLOOD PRESSURE: 63 MMHG | SYSTOLIC BLOOD PRESSURE: 139 MMHG

## 2020-03-03 DIAGNOSIS — Z71.6: ICD-10-CM

## 2020-03-03 DIAGNOSIS — J20.9: ICD-10-CM

## 2020-03-03 DIAGNOSIS — J44.1: Primary | ICD-10-CM

## 2020-03-03 DIAGNOSIS — F17.210: ICD-10-CM

## 2020-03-03 LAB
ALBUMIN SERPL-MCNC: 3.3 G/DL (ref 3.4–5)
ALP SERPL-CCNC: 57 U/L (ref 45–117)
ALT SERPL-CCNC: 20 U/L (ref 16–61)
ANION GAP SERPL CALCULATED.3IONS-SCNC: 5 MMOL/L (ref 5–15)
APTT PPP: 30.2 SEC (ref 23.64–32.05)
BILIRUB SERPL-MCNC: 0.3 MG/DL (ref 0.2–1)
BUN SERPL-MCNC: 14 MG/DL (ref 7–18)
BUN/CREAT SERPL: 11.6
CALCIUM SERPL-MCNC: 8.6 MG/DL (ref 8.5–10.1)
CHLORIDE SERPL-SCNC: 106 MMOL/L (ref 98–107)
CO2 SERPL-SCNC: 31 MMOL/L (ref 21–32)
GLUCOSE SERPL-MCNC: 139 MG/DL (ref 74–106)
HCT VFR BLD AUTO: 42.8 % (ref 41–53)
HGB BLD-MCNC: 14.4 G/DL (ref 13.5–17.5)
INR PPP: 1.01 (ref 0.9–1.15)
MAGNESIUM SERPL-MCNC: 2.2 MG/DL (ref 1.6–2.6)
MCH RBC QN AUTO: 36.7 PG (ref 28–32)
MCV RBC AUTO: 109.3 FL (ref 80–100)
NRBC BLD QL AUTO: 0.1 %
POTASSIUM SERPL-SCNC: 4.2 MMOL/L (ref 3.5–5.1)
PROT SERPL-MCNC: 7.1 G/DL (ref 6.4–8.2)
SODIUM SERPL-SCNC: 142 MMOL/L (ref 136–145)

## 2020-03-03 PROCEDURE — 93005 ELECTROCARDIOGRAM TRACING: CPT

## 2020-03-03 PROCEDURE — 85025 COMPLETE CBC W/AUTO DIFF WBC: CPT

## 2020-03-03 PROCEDURE — 80053 COMPREHEN METABOLIC PANEL: CPT

## 2020-03-03 PROCEDURE — 85730 THROMBOPLASTIN TIME PARTIAL: CPT

## 2020-03-03 PROCEDURE — 84484 ASSAY OF TROPONIN QUANT: CPT

## 2020-03-03 PROCEDURE — 94644 CONT INHLJ TX 1ST HOUR: CPT

## 2020-03-03 PROCEDURE — 96374 THER/PROPH/DIAG INJ IV PUSH: CPT

## 2020-03-03 PROCEDURE — 85610 PROTHROMBIN TIME: CPT

## 2020-03-03 PROCEDURE — 99285 EMERGENCY DEPT VISIT HI MDM: CPT

## 2020-03-03 PROCEDURE — 83735 ASSAY OF MAGNESIUM: CPT

## 2020-03-03 PROCEDURE — 71045 X-RAY EXAM CHEST 1 VIEW: CPT

## 2020-03-03 PROCEDURE — 83880 ASSAY OF NATRIURETIC PEPTIDE: CPT

## 2020-03-03 PROCEDURE — 36415 COLL VENOUS BLD VENIPUNCTURE: CPT

## 2020-03-12 ENCOUNTER — HOSPITAL ENCOUNTER (INPATIENT)
Dept: HOSPITAL 15 - ER | Age: 70
LOS: 2 days | Discharge: HOME | DRG: 189 | End: 2020-03-14
Attending: INTERNAL MEDICINE | Admitting: INTERNAL MEDICINE
Payer: COMMERCIAL

## 2020-03-12 VITALS — WEIGHT: 220.02 LBS | BODY MASS INDEX: 29.8 KG/M2 | HEIGHT: 72 IN

## 2020-03-12 VITALS — SYSTOLIC BLOOD PRESSURE: 165 MMHG | DIASTOLIC BLOOD PRESSURE: 93 MMHG

## 2020-03-12 VITALS — DIASTOLIC BLOOD PRESSURE: 82 MMHG | SYSTOLIC BLOOD PRESSURE: 168 MMHG

## 2020-03-12 VITALS — SYSTOLIC BLOOD PRESSURE: 148 MMHG | DIASTOLIC BLOOD PRESSURE: 81 MMHG

## 2020-03-12 DIAGNOSIS — J44.1: ICD-10-CM

## 2020-03-12 DIAGNOSIS — Z82.3: ICD-10-CM

## 2020-03-12 DIAGNOSIS — J96.21: Primary | ICD-10-CM

## 2020-03-12 DIAGNOSIS — F41.9: ICD-10-CM

## 2020-03-12 DIAGNOSIS — E66.9: ICD-10-CM

## 2020-03-12 DIAGNOSIS — Z82.49: ICD-10-CM

## 2020-03-12 DIAGNOSIS — K62.5: ICD-10-CM

## 2020-03-12 DIAGNOSIS — J45.909: ICD-10-CM

## 2020-03-12 LAB
ALBUMIN SERPL-MCNC: 3.4 G/DL (ref 3.4–5)
ALP SERPL-CCNC: 48 U/L (ref 45–117)
ALT SERPL-CCNC: 21 U/L (ref 16–61)
ANION GAP SERPL CALCULATED.3IONS-SCNC: 3 MMOL/L (ref 5–15)
BILIRUB SERPL-MCNC: 0.5 MG/DL (ref 0.2–1)
BUN SERPL-MCNC: 21 MG/DL (ref 7–18)
BUN/CREAT SERPL: 19.6
CALCIUM SERPL-MCNC: 8.8 MG/DL (ref 8.5–10.1)
CHLORIDE SERPL-SCNC: 104 MMOL/L (ref 98–107)
CO2 SERPL-SCNC: 32 MMOL/L (ref 21–32)
GLUCOSE SERPL-MCNC: 136 MG/DL (ref 74–106)
HCT VFR BLD AUTO: 45.5 % (ref 41–53)
HGB BLD-MCNC: 15.5 G/DL (ref 13.5–17.5)
MCH RBC QN AUTO: 36.8 PG (ref 28–32)
MCV RBC AUTO: 108 FL (ref 80–100)
NRBC BLD QL AUTO: 0 %
POTASSIUM SERPL-SCNC: 3.5 MMOL/L (ref 3.5–5.1)
PROT SERPL-MCNC: 6.6 G/DL (ref 6.4–8.2)
SODIUM SERPL-SCNC: 139 MMOL/L (ref 136–145)

## 2020-03-12 PROCEDURE — 94640 AIRWAY INHALATION TREATMENT: CPT

## 2020-03-12 PROCEDURE — 36600 WITHDRAWAL OF ARTERIAL BLOOD: CPT

## 2020-03-12 PROCEDURE — 80053 COMPREHEN METABOLIC PANEL: CPT

## 2020-03-12 PROCEDURE — 83036 HEMOGLOBIN GLYCOSYLATED A1C: CPT

## 2020-03-12 PROCEDURE — 96372 THER/PROPH/DIAG INJ SC/IM: CPT

## 2020-03-12 PROCEDURE — 82805 BLOOD GASES W/O2 SATURATION: CPT

## 2020-03-12 PROCEDURE — 71045 X-RAY EXAM CHEST 1 VIEW: CPT

## 2020-03-12 PROCEDURE — 71275 CT ANGIOGRAPHY CHEST: CPT

## 2020-03-12 PROCEDURE — 36415 COLL VENOUS BLD VENIPUNCTURE: CPT

## 2020-03-12 PROCEDURE — 87081 CULTURE SCREEN ONLY: CPT

## 2020-03-12 PROCEDURE — 80048 BASIC METABOLIC PNL TOTAL CA: CPT

## 2020-03-12 PROCEDURE — 82962 GLUCOSE BLOOD TEST: CPT

## 2020-03-12 PROCEDURE — 85379 FIBRIN DEGRADATION QUANT: CPT

## 2020-03-12 PROCEDURE — 85025 COMPLETE CBC W/AUTO DIFF WBC: CPT

## 2020-03-12 PROCEDURE — 96374 THER/PROPH/DIAG INJ IV PUSH: CPT

## 2020-03-12 PROCEDURE — 93005 ELECTROCARDIOGRAM TRACING: CPT

## 2020-03-12 PROCEDURE — 87804 INFLUENZA ASSAY W/OPTIC: CPT

## 2020-03-12 PROCEDURE — 83880 ASSAY OF NATRIURETIC PEPTIDE: CPT

## 2020-03-12 PROCEDURE — 99291 CRITICAL CARE FIRST HOUR: CPT

## 2020-03-12 PROCEDURE — 84484 ASSAY OF TROPONIN QUANT: CPT

## 2020-03-12 RX ADMIN — IPRATROPIUM BROMIDE SCH MG: 0.5 SOLUTION RESPIRATORY (INHALATION) at 21:33

## 2020-03-12 RX ADMIN — DOXYCYCLINE SCH MLS/HR: 100 INJECTION, POWDER, LYOPHILIZED, FOR SOLUTION INTRAVENOUS at 19:34

## 2020-03-12 RX ADMIN — METHYLPREDNISOLONE SODIUM SUCCINATE SCH MG: 40 INJECTION, POWDER, FOR SOLUTION INTRAMUSCULAR; INTRAVENOUS at 19:33

## 2020-03-12 RX ADMIN — Medication SCH STRIP: at 22:31

## 2020-03-12 RX ADMIN — ALBUTEROL SULFATE SCH MG: 2.5 SOLUTION RESPIRATORY (INHALATION) at 21:33

## 2020-03-13 VITALS — SYSTOLIC BLOOD PRESSURE: 166 MMHG | DIASTOLIC BLOOD PRESSURE: 75 MMHG

## 2020-03-13 VITALS — DIASTOLIC BLOOD PRESSURE: 89 MMHG | SYSTOLIC BLOOD PRESSURE: 149 MMHG

## 2020-03-13 VITALS — SYSTOLIC BLOOD PRESSURE: 147 MMHG | DIASTOLIC BLOOD PRESSURE: 85 MMHG

## 2020-03-13 VITALS — DIASTOLIC BLOOD PRESSURE: 85 MMHG | SYSTOLIC BLOOD PRESSURE: 147 MMHG

## 2020-03-13 VITALS — SYSTOLIC BLOOD PRESSURE: 158 MMHG | DIASTOLIC BLOOD PRESSURE: 73 MMHG

## 2020-03-13 VITALS — SYSTOLIC BLOOD PRESSURE: 130 MMHG | DIASTOLIC BLOOD PRESSURE: 78 MMHG

## 2020-03-13 LAB
ANION GAP SERPL CALCULATED.3IONS-SCNC: 2 MMOL/L (ref 5–15)
BUN SERPL-MCNC: 18 MG/DL (ref 7–18)
BUN/CREAT SERPL: 19.4
CALCIUM SERPL-MCNC: 8.4 MG/DL (ref 8.5–10.1)
CHLORIDE SERPL-SCNC: 104 MMOL/L (ref 98–107)
CO2 SERPL-SCNC: 31 MMOL/L (ref 21–32)
GLUCOSE SERPL-MCNC: 218 MG/DL (ref 74–106)
HCT VFR BLD AUTO: 42.9 % (ref 41–53)
HGB BLD-MCNC: 14.5 G/DL (ref 13.5–17.5)
MCH RBC QN AUTO: 36.5 PG (ref 28–32)
MCV RBC AUTO: 108.1 FL (ref 80–100)
NRBC BLD QL AUTO: 0 %
POTASSIUM SERPL-SCNC: 4 MMOL/L (ref 3.5–5.1)
SODIUM SERPL-SCNC: 137 MMOL/L (ref 136–145)

## 2020-03-13 RX ADMIN — METHYLPREDNISOLONE SODIUM SUCCINATE SCH MG: 40 INJECTION, POWDER, FOR SOLUTION INTRAMUSCULAR; INTRAVENOUS at 19:07

## 2020-03-13 RX ADMIN — Medication SCH STRIP: at 22:00

## 2020-03-13 RX ADMIN — DOXYCYCLINE SCH MLS/HR: 100 INJECTION, POWDER, LYOPHILIZED, FOR SOLUTION INTRAVENOUS at 07:33

## 2020-03-13 RX ADMIN — METHYLPREDNISOLONE SODIUM SUCCINATE SCH MG: 40 INJECTION, POWDER, FOR SOLUTION INTRAMUSCULAR; INTRAVENOUS at 07:33

## 2020-03-13 RX ADMIN — ALBUTEROL SULFATE SCH MG: 2.5 SOLUTION RESPIRATORY (INHALATION) at 18:44

## 2020-03-13 RX ADMIN — Medication SCH STRIP: at 06:50

## 2020-03-13 RX ADMIN — Medication SCH STRIP: at 17:51

## 2020-03-13 RX ADMIN — DOXYCYCLINE SCH MLS/HR: 100 INJECTION, POWDER, LYOPHILIZED, FOR SOLUTION INTRAVENOUS at 19:07

## 2020-03-13 RX ADMIN — IPRATROPIUM BROMIDE SCH MG: 0.5 SOLUTION RESPIRATORY (INHALATION) at 11:49

## 2020-03-13 RX ADMIN — IPRATROPIUM BROMIDE SCH MG: 0.5 SOLUTION RESPIRATORY (INHALATION) at 06:29

## 2020-03-13 RX ADMIN — ALBUTEROL SULFATE SCH MG: 2.5 SOLUTION RESPIRATORY (INHALATION) at 06:29

## 2020-03-13 RX ADMIN — ALBUTEROL SULFATE SCH MG: 2.5 SOLUTION RESPIRATORY (INHALATION) at 11:49

## 2020-03-13 RX ADMIN — IPRATROPIUM BROMIDE SCH MG: 0.5 SOLUTION RESPIRATORY (INHALATION) at 18:44

## 2020-03-13 RX ADMIN — Medication SCH STRIP: at 11:44

## 2020-03-14 NOTE — NUR
CNA REPORTED HIGH BP. REASSESSED /70, HOSPITALIST PAGED.
Discharge

Discharge instructions given as ordered. Encourage to follow up with PMD as instructed. All 
questions and concerns addressed. Patient verbalized understanding.  All discharge paperwork 
and instructional material completed and copy given to patient. IV removed by Yuridia RN with 
catheter intact, pressure dressing applied.  Telemetry unit returned to ICU and monitor 
technicians informed. Wristbands removed. Patient ambulated to front lobby independently 
with steady gait noted with all personal belongings, accompanied by this RN. Taxi voucher 
ready for patient in ER admitting and staff aware taxi en route. No distress noted at time 
of departure.
Dr. LEO Pereira at bedside, new orders received, read back and verified, will implement and 
continue to monitor.
HOSPITALIST CALLED BACK, NEW ORDERS GIVEN, READ BACK, AND VERIFIED.
HOSPITALIST PAGED 

PATIENT COMPLAINED OF SMALL AMOUNT OF BRIGHT RED BLOOD IN STOOL. VSS. NO COMPLAINTS OF PAIN. 
PATIENT IS ASYMPTOMATIC. HOSPITALIST PAGED. WILL CONTINUE TO MONITOR.
Opening Shift Note

Assumed care of patient, awake and alert.  No S/S of distress/SOB or pain. Bed in lowest 
locked position, side rails up x2, call light within reach. Patient denies bloody stool at 
this time, reports last stool was soft, brown, and formed with no blood. Instructed on POC 
and to call for assist PRN, will continue to monitor for changes Q1hr and PRN.
Opening Shift Note

Assumed care of patient, awake and alert. No S/S of distress/SOB or pain. Instructed on POC 
and to call for assist PRN, will continue to monitor for changes Q1hr and PRN.
Telemetry admit from MILENA HAMMOND admitted to Telemetry unit after SBAR received. Patient oriented to AFIA LEE RN primary RN, unit, room, bed, and unit policies regarding patient care and 
visiting hours. Patient now on continuous telemetry monitoring, tele box #25  and telemetry 
reading on arrival to unit is NSR. Upon arrival to the floor, no s/s of SOB or distress 
noted and patient denies pain at this time. Patient A&O x4. Patient placed on bedside 
oxygen, weighed by bedscale and encouraged to call if they need something. All questions and 
concerns addressed, patient verbalized understanding. 

Note:
bloody stool noted.

small amount of dark red mucoid like bowel movement.
You can access the FollowMyHealth Patient Portal offered by [facility] by registering at the following website: [email or web address]. By joining [facility]’s FollowMyHealth portal, you will also be able to view your health information using other applications (apps) compatible with our system.

## 2020-04-17 ENCOUNTER — HOSPITAL ENCOUNTER (INPATIENT)
Dept: HOSPITAL 15 - ER | Age: 70
LOS: 3 days | Discharge: HOME | DRG: 189 | End: 2020-04-20
Attending: INTERNAL MEDICINE | Admitting: INTERNAL MEDICINE
Payer: COMMERCIAL

## 2020-04-17 VITALS — WEIGHT: 222.67 LBS | BODY MASS INDEX: 30.16 KG/M2 | HEIGHT: 72 IN

## 2020-04-17 VITALS — SYSTOLIC BLOOD PRESSURE: 156 MMHG | DIASTOLIC BLOOD PRESSURE: 94 MMHG

## 2020-04-17 VITALS — SYSTOLIC BLOOD PRESSURE: 130 MMHG | DIASTOLIC BLOOD PRESSURE: 77 MMHG

## 2020-04-17 VITALS — DIASTOLIC BLOOD PRESSURE: 91 MMHG | SYSTOLIC BLOOD PRESSURE: 165 MMHG

## 2020-04-17 VITALS — SYSTOLIC BLOOD PRESSURE: 136 MMHG | DIASTOLIC BLOOD PRESSURE: 79 MMHG

## 2020-04-17 DIAGNOSIS — Z71.6: ICD-10-CM

## 2020-04-17 DIAGNOSIS — Z79.51: ICD-10-CM

## 2020-04-17 DIAGNOSIS — Z82.3: ICD-10-CM

## 2020-04-17 DIAGNOSIS — J44.1: ICD-10-CM

## 2020-04-17 DIAGNOSIS — F17.210: ICD-10-CM

## 2020-04-17 DIAGNOSIS — Z91.19: ICD-10-CM

## 2020-04-17 DIAGNOSIS — Z99.81: ICD-10-CM

## 2020-04-17 DIAGNOSIS — J96.20: Primary | ICD-10-CM

## 2020-04-17 DIAGNOSIS — Z03.818: ICD-10-CM

## 2020-04-17 DIAGNOSIS — Z82.49: ICD-10-CM

## 2020-04-17 DIAGNOSIS — Z79.899: ICD-10-CM

## 2020-04-17 LAB
ALBUMIN SERPL-MCNC: 3.7 G/DL (ref 3.4–5)
ALP SERPL-CCNC: 62 U/L (ref 45–117)
ALT SERPL-CCNC: 32 U/L (ref 16–61)
ANION GAP SERPL CALCULATED.3IONS-SCNC: 11 MMOL/L (ref 5–15)
APTT PPP: 27.8 SEC (ref 23.64–32.05)
BILIRUB SERPL-MCNC: 0.5 MG/DL (ref 0.2–1)
BUN SERPL-MCNC: 23 MG/DL (ref 7–18)
BUN/CREAT SERPL: 19.7
CALCIUM SERPL-MCNC: 8.7 MG/DL (ref 8.5–10.1)
CHLORIDE SERPL-SCNC: 104 MMOL/L (ref 98–107)
CO2 SERPL-SCNC: 27 MMOL/L (ref 21–32)
GLUCOSE SERPL-MCNC: 126 MG/DL (ref 74–106)
HCT VFR BLD AUTO: 50 % (ref 41–53)
HGB BLD-MCNC: 16.9 G/DL (ref 13.5–17.5)
INR PPP: 1.03 (ref 0.9–1.15)
MAGNESIUM SERPL-MCNC: 2.2 MG/DL (ref 1.6–2.6)
MCH RBC QN AUTO: 36.4 PG (ref 28–32)
MCV RBC AUTO: 107.6 FL (ref 80–100)
NRBC BLD QL AUTO: 0.1 %
POTASSIUM SERPL-SCNC: 5.1 MMOL/L (ref 3.5–5.1)
PROT SERPL-MCNC: 7.7 G/DL (ref 6.4–8.2)
SODIUM SERPL-SCNC: 142 MMOL/L (ref 136–145)

## 2020-04-17 PROCEDURE — 83880 ASSAY OF NATRIURETIC PEPTIDE: CPT

## 2020-04-17 PROCEDURE — 80048 BASIC METABOLIC PNL TOTAL CA: CPT

## 2020-04-17 PROCEDURE — 36600 WITHDRAWAL OF ARTERIAL BLOOD: CPT

## 2020-04-17 PROCEDURE — 82728 ASSAY OF FERRITIN: CPT

## 2020-04-17 PROCEDURE — 87070 CULTURE OTHR SPECIMN AEROBIC: CPT

## 2020-04-17 PROCEDURE — 83735 ASSAY OF MAGNESIUM: CPT

## 2020-04-17 PROCEDURE — 85025 COMPLETE CBC W/AUTO DIFF WBC: CPT

## 2020-04-17 PROCEDURE — 71045 X-RAY EXAM CHEST 1 VIEW: CPT

## 2020-04-17 PROCEDURE — 36415 COLL VENOUS BLD VENIPUNCTURE: CPT

## 2020-04-17 PROCEDURE — 85379 FIBRIN DEGRADATION QUANT: CPT

## 2020-04-17 PROCEDURE — 85730 THROMBOPLASTIN TIME PARTIAL: CPT

## 2020-04-17 PROCEDURE — 82805 BLOOD GASES W/O2 SATURATION: CPT

## 2020-04-17 PROCEDURE — 85610 PROTHROMBIN TIME: CPT

## 2020-04-17 PROCEDURE — 80053 COMPREHEN METABOLIC PANEL: CPT

## 2020-04-17 PROCEDURE — 94640 AIRWAY INHALATION TREATMENT: CPT

## 2020-04-17 PROCEDURE — 84484 ASSAY OF TROPONIN QUANT: CPT

## 2020-04-17 PROCEDURE — 71260 CT THORAX DX C+: CPT

## 2020-04-17 PROCEDURE — 86141 C-REACTIVE PROTEIN HS: CPT

## 2020-04-17 PROCEDURE — 87880 STREP A ASSAY W/OPTIC: CPT

## 2020-04-17 PROCEDURE — 99291 CRITICAL CARE FIRST HOUR: CPT

## 2020-04-17 PROCEDURE — 93005 ELECTROCARDIOGRAM TRACING: CPT

## 2020-04-17 PROCEDURE — 87804 INFLUENZA ASSAY W/OPTIC: CPT

## 2020-04-17 RX ADMIN — VITAMIN D, TAB 1000IU (100/BT) SCH UNIT: 25 TAB at 14:14

## 2020-04-17 RX ADMIN — ALBUTEROL SULFATE SCH MG: 2.5 SOLUTION RESPIRATORY (INHALATION) at 22:24

## 2020-04-17 RX ADMIN — METHYLPREDNISOLONE SODIUM SUCCINATE SCH MG: 40 INJECTION, POWDER, FOR SOLUTION INTRAMUSCULAR; INTRAVENOUS at 14:15

## 2020-04-17 RX ADMIN — Medication SCH MG: at 14:14

## 2020-04-17 RX ADMIN — METHYLPREDNISOLONE SODIUM SUCCINATE SCH MG: 40 INJECTION, POWDER, FOR SOLUTION INTRAMUSCULAR; INTRAVENOUS at 22:39

## 2020-04-17 NOTE — NUR
Telemetry admit from ER

MILENA DANIELS admitted to Telemetry unit after SBAR received.  Patient oriented to JOSHUA STONE RN primary RN,  [facility],249 room,b bed, and unit policies regarding patient 
care and visiting hours. Patient now on continuous telemetry monitoring, tele box #15 and 
telemetry reading on arrival to unit is sr 80. Patient placed on bedside oxygen, weighed by 
bedscale and encouraged to call if they need something. All questions and concerns 
addressed, patient verbalized understanding.

## 2020-04-17 NOTE — NUR
TRANSFER 

PATIENT BROUGHT VIA WHEELCHAIR TO ROOM 284B AND ORIENTED TO PRIMARY RN, ROOM, AND UNIT. 
PATIENT PLACED ON 4L NC AND TELEMETRY BOX REQUESTED. PATIENT IMMEDIATELY BELLIGERENT AND 
YELLING AT STAFF. VERBALIZED TO PATIENT THAT THIS BEHAVIOR WILL NOT BE TOLERATED AND HE WILL 
REMAIN RESPECTFUL TOWARDS STAFF. PATIENT UPDATED ON POC AND ALL QUESTIONS ANSWERED. BED IS 
IN LOWEST, LOCKED POSITION WITH SIDE RAILS UP X2 AND CALL LIGHT WITHIN REACH. WILL CONTINUE 
TO MONITOR.

## 2020-04-17 NOTE — NUR
Opening Shift Note

Assumed care of patient, awake and alert.  No S/S of distress/SOB or pain.  Instructed on 
POC and to call for assist PRN, will continue to monitor for changes Q1hr and PRN. PATIENT 
RESTING IN BED, BED IN LOWEST POSITION, SIDE RALES UP X2, CALL LIGHT WITHIN REACH.

## 2020-04-18 VITALS — SYSTOLIC BLOOD PRESSURE: 141 MMHG | DIASTOLIC BLOOD PRESSURE: 89 MMHG

## 2020-04-18 VITALS — SYSTOLIC BLOOD PRESSURE: 160 MMHG | DIASTOLIC BLOOD PRESSURE: 78 MMHG

## 2020-04-18 VITALS — DIASTOLIC BLOOD PRESSURE: 81 MMHG | SYSTOLIC BLOOD PRESSURE: 129 MMHG

## 2020-04-18 VITALS — SYSTOLIC BLOOD PRESSURE: 171 MMHG | DIASTOLIC BLOOD PRESSURE: 97 MMHG

## 2020-04-18 VITALS — SYSTOLIC BLOOD PRESSURE: 158 MMHG | DIASTOLIC BLOOD PRESSURE: 69 MMHG

## 2020-04-18 VITALS — SYSTOLIC BLOOD PRESSURE: 151 MMHG | DIASTOLIC BLOOD PRESSURE: 89 MMHG

## 2020-04-18 LAB
HCT VFR BLD AUTO: 47.4 % (ref 41–53)
HGB BLD-MCNC: 15.9 G/DL (ref 13.5–17.5)
MCH RBC QN AUTO: 35.9 PG (ref 28–32)
MCV RBC AUTO: 107.2 FL (ref 80–100)
NRBC BLD QL AUTO: 0.1 %

## 2020-04-18 RX ADMIN — IPRATROPIUM BROMIDE SCH MG: 0.5 SOLUTION RESPIRATORY (INHALATION) at 22:46

## 2020-04-18 RX ADMIN — ALBUTEROL SULFATE SCH MG: 2.5 SOLUTION RESPIRATORY (INHALATION) at 13:53

## 2020-04-18 RX ADMIN — IPRATROPIUM BROMIDE SCH MG: 0.5 SOLUTION RESPIRATORY (INHALATION) at 22:54

## 2020-04-18 RX ADMIN — METHYLPREDNISOLONE SODIUM SUCCINATE SCH MG: 40 INJECTION, POWDER, FOR SOLUTION INTRAMUSCULAR; INTRAVENOUS at 14:15

## 2020-04-18 RX ADMIN — METHYLPREDNISOLONE SODIUM SUCCINATE SCH MG: 40 INJECTION, POWDER, FOR SOLUTION INTRAMUSCULAR; INTRAVENOUS at 21:24

## 2020-04-18 RX ADMIN — IPRATROPIUM BROMIDE SCH MG: 0.5 SOLUTION RESPIRATORY (INHALATION) at 18:47

## 2020-04-18 RX ADMIN — IPRATROPIUM BROMIDE SCH MG: 0.5 SOLUTION RESPIRATORY (INHALATION) at 13:50

## 2020-04-18 RX ADMIN — METHYLPREDNISOLONE SODIUM SUCCINATE SCH MG: 40 INJECTION, POWDER, FOR SOLUTION INTRAMUSCULAR; INTRAVENOUS at 05:32

## 2020-04-18 RX ADMIN — ALBUTEROL SULFATE SCH MG: 2.5 SOLUTION RESPIRATORY (INHALATION) at 18:47

## 2020-04-18 RX ADMIN — IPRATROPIUM BROMIDE SCH MG: 0.5 SOLUTION RESPIRATORY (INHALATION) at 13:54

## 2020-04-18 RX ADMIN — VITAMIN D, TAB 1000IU (100/BT) SCH UNIT: 25 TAB at 10:38

## 2020-04-18 RX ADMIN — ALBUTEROL SULFATE SCH MG: 2.5 SOLUTION RESPIRATORY (INHALATION) at 22:46

## 2020-04-18 RX ADMIN — Medication SCH MG: at 10:00

## 2020-04-18 RX ADMIN — ALBUTEROL SULFATE SCH MG: 2.5 SOLUTION RESPIRATORY (INHALATION) at 13:50

## 2020-04-18 RX ADMIN — METHYLPREDNISOLONE SODIUM SUCCINATE SCH MG: 40 INJECTION, POWDER, FOR SOLUTION INTRAMUSCULAR; INTRAVENOUS at 14:00

## 2020-04-18 RX ADMIN — NICOTINE SCH PATCH: 21 PATCH, EXTENDED RELEASE TRANSDERMAL at 10:39

## 2020-04-18 RX ADMIN — ENOXAPARIN SODIUM SCH MG: 40 INJECTION SUBCUTANEOUS at 10:38

## 2020-04-18 RX ADMIN — ALBUTEROL SULFATE SCH MG: 2.5 SOLUTION RESPIRATORY (INHALATION) at 06:58

## 2020-04-18 RX ADMIN — ALBUTEROL SULFATE SCH MG: 2.5 SOLUTION RESPIRATORY (INHALATION) at 22:54

## 2020-04-18 RX ADMIN — AZITHROMYCIN DIHYDRATE SCH MLS/HR: 500 INJECTION, POWDER, LYOPHILIZED, FOR SOLUTION INTRAVENOUS at 10:37

## 2020-04-18 NOTE — NUR
IV insertion

IV access obtained, via clean sterile technique by inserting 22 gauge catheter at right 
forearm after 1 attempt. IV secured properly. No trauma to site. Patient tolerated well.

## 2020-04-19 VITALS — SYSTOLIC BLOOD PRESSURE: 126 MMHG | DIASTOLIC BLOOD PRESSURE: 76 MMHG

## 2020-04-19 VITALS — SYSTOLIC BLOOD PRESSURE: 137 MMHG | DIASTOLIC BLOOD PRESSURE: 72 MMHG

## 2020-04-19 VITALS — SYSTOLIC BLOOD PRESSURE: 156 MMHG | DIASTOLIC BLOOD PRESSURE: 74 MMHG

## 2020-04-19 VITALS — SYSTOLIC BLOOD PRESSURE: 157 MMHG | DIASTOLIC BLOOD PRESSURE: 87 MMHG

## 2020-04-19 VITALS — DIASTOLIC BLOOD PRESSURE: 90 MMHG | SYSTOLIC BLOOD PRESSURE: 149 MMHG

## 2020-04-19 LAB
ANION GAP SERPL CALCULATED.3IONS-SCNC: 9 MMOL/L (ref 5–15)
BUN SERPL-MCNC: 28 MG/DL (ref 7–18)
BUN/CREAT SERPL: 25.2
CALCIUM SERPL-MCNC: 8.6 MG/DL (ref 8.5–10.1)
CHLORIDE SERPL-SCNC: 103 MMOL/L (ref 98–107)
CO2 SERPL-SCNC: 26 MMOL/L (ref 21–32)
GLUCOSE SERPL-MCNC: 234 MG/DL (ref 74–106)
HCT VFR BLD AUTO: 46.1 % (ref 41–53)
HGB BLD-MCNC: 15.6 G/DL (ref 13.5–17.5)
MAGNESIUM SERPL-MCNC: 2.1 MG/DL (ref 1.6–2.6)
MCH RBC QN AUTO: 36.1 PG (ref 28–32)
MCV RBC AUTO: 106.5 FL (ref 80–100)
NRBC BLD QL AUTO: 0.1 %
POTASSIUM SERPL-SCNC: 4.3 MMOL/L (ref 3.5–5.1)
SODIUM SERPL-SCNC: 138 MMOL/L (ref 136–145)

## 2020-04-19 RX ADMIN — ALBUTEROL SULFATE SCH MG: 2.5 SOLUTION RESPIRATORY (INHALATION) at 22:18

## 2020-04-19 RX ADMIN — ENOXAPARIN SODIUM SCH MG: 40 INJECTION SUBCUTANEOUS at 09:21

## 2020-04-19 RX ADMIN — METHYLPREDNISOLONE SODIUM SUCCINATE SCH MG: 40 INJECTION, POWDER, FOR SOLUTION INTRAMUSCULAR; INTRAVENOUS at 21:15

## 2020-04-19 RX ADMIN — IPRATROPIUM BROMIDE SCH MG: 0.5 SOLUTION RESPIRATORY (INHALATION) at 19:19

## 2020-04-19 RX ADMIN — ALBUTEROL SULFATE SCH MG: 2.5 SOLUTION RESPIRATORY (INHALATION) at 10:12

## 2020-04-19 RX ADMIN — METHYLPREDNISOLONE SODIUM SUCCINATE SCH MG: 40 INJECTION, POWDER, FOR SOLUTION INTRAMUSCULAR; INTRAVENOUS at 13:31

## 2020-04-19 RX ADMIN — IPRATROPIUM BROMIDE SCH MG: 0.5 SOLUTION RESPIRATORY (INHALATION) at 06:00

## 2020-04-19 RX ADMIN — IPRATROPIUM BROMIDE SCH MG: 0.5 SOLUTION RESPIRATORY (INHALATION) at 03:02

## 2020-04-19 RX ADMIN — IPRATROPIUM BROMIDE SCH MG: 0.5 SOLUTION RESPIRATORY (INHALATION) at 10:12

## 2020-04-19 RX ADMIN — AZITHROMYCIN DIHYDRATE SCH MLS/HR: 500 INJECTION, POWDER, LYOPHILIZED, FOR SOLUTION INTRAVENOUS at 09:20

## 2020-04-19 RX ADMIN — IPRATROPIUM BROMIDE SCH MG: 0.5 SOLUTION RESPIRATORY (INHALATION) at 22:18

## 2020-04-19 RX ADMIN — ALBUTEROL SULFATE SCH MG: 2.5 SOLUTION RESPIRATORY (INHALATION) at 13:52

## 2020-04-19 RX ADMIN — METHYLPREDNISOLONE SODIUM SUCCINATE SCH MG: 40 INJECTION, POWDER, FOR SOLUTION INTRAMUSCULAR; INTRAVENOUS at 05:44

## 2020-04-19 RX ADMIN — ALBUTEROL SULFATE SCH MG: 2.5 SOLUTION RESPIRATORY (INHALATION) at 03:02

## 2020-04-19 RX ADMIN — IPRATROPIUM BROMIDE SCH MG: 0.5 SOLUTION RESPIRATORY (INHALATION) at 13:52

## 2020-04-19 RX ADMIN — ALBUTEROL SULFATE SCH MG: 2.5 SOLUTION RESPIRATORY (INHALATION) at 06:00

## 2020-04-19 RX ADMIN — NICOTINE SCH PATCH: 21 PATCH, EXTENDED RELEASE TRANSDERMAL at 09:21

## 2020-04-19 RX ADMIN — ALBUTEROL SULFATE SCH MG: 2.5 SOLUTION RESPIRATORY (INHALATION) at 19:19

## 2020-04-19 NOTE — NUR
Opening Shift Note

Assumed care of patient, awake and alert.  No S/S of distress/SOB or Pain reported. 
Instructed on POC and to call for assist PRN, will continue to monitor for changes Q1hr and 
PRN.

## 2020-04-19 NOTE — NUR
IV insertion

IV access obtained, via clean sterile technique by inserting 22 gauge catheter at LEFT 
FOREARM after 2 attempt(s). IV secured properly. No trauma to site. Patient tolerated well.



IV removal

IV DC'd with clean sterile technique, catheter fully intact. Pressure dressing applied to 
site. Patient tolerated well.

## 2020-04-20 VITALS — SYSTOLIC BLOOD PRESSURE: 153 MMHG | DIASTOLIC BLOOD PRESSURE: 82 MMHG

## 2020-04-20 VITALS — DIASTOLIC BLOOD PRESSURE: 66 MMHG | SYSTOLIC BLOOD PRESSURE: 149 MMHG

## 2020-04-20 VITALS — DIASTOLIC BLOOD PRESSURE: 68 MMHG | SYSTOLIC BLOOD PRESSURE: 152 MMHG

## 2020-04-20 RX ADMIN — IPRATROPIUM BROMIDE SCH MG: 0.5 SOLUTION RESPIRATORY (INHALATION) at 10:00

## 2020-04-20 RX ADMIN — ALBUTEROL SULFATE SCH MG: 2.5 SOLUTION RESPIRATORY (INHALATION) at 10:00

## 2020-04-20 RX ADMIN — ALBUTEROL SULFATE SCH MG: 2.5 SOLUTION RESPIRATORY (INHALATION) at 06:10

## 2020-04-20 RX ADMIN — ALBUTEROL SULFATE SCH MG: 2.5 SOLUTION RESPIRATORY (INHALATION) at 02:17

## 2020-04-20 RX ADMIN — IPRATROPIUM BROMIDE SCH MG: 0.5 SOLUTION RESPIRATORY (INHALATION) at 02:17

## 2020-04-20 RX ADMIN — ENOXAPARIN SODIUM SCH MG: 40 INJECTION SUBCUTANEOUS at 08:21

## 2020-04-20 RX ADMIN — NICOTINE SCH PATCH: 21 PATCH, EXTENDED RELEASE TRANSDERMAL at 08:21

## 2020-04-20 RX ADMIN — METHYLPREDNISOLONE SODIUM SUCCINATE SCH MG: 40 INJECTION, POWDER, FOR SOLUTION INTRAMUSCULAR; INTRAVENOUS at 05:50

## 2020-04-20 RX ADMIN — IPRATROPIUM BROMIDE SCH MG: 0.5 SOLUTION RESPIRATORY (INHALATION) at 06:10

## 2020-04-20 RX ADMIN — AZITHROMYCIN DIHYDRATE SCH MLS/HR: 500 INJECTION, POWDER, LYOPHILIZED, FOR SOLUTION INTRAVENOUS at 08:20

## 2020-04-20 NOTE — NUR
Pulse ox 85% on room air after ambulation



Patient had noted dyspnea. Respirations labored. Patient placed on 2LPM NC. Pulse ox 94%. 
Respirations become even and unlabored, no distress noted. 

-------------------------------------------------------------------------------

Addendum: 04/20/20 at 1058 by Courtney McBurney RN

-------------------------------------------------------------------------------

Notified Dr. Ya. MD verbalized understanding. Patient has supplemental oxygen at home. 
Dr. Ya to let this RN know if patient will receive supplemental portable oxygen to 
bedside.

## 2020-04-20 NOTE — NUR
Assessment 



Patient is a 70-year-old male who is alert and oriented. Prior to admission patient lived 
home alone and functioned independently. Patient informed me he can care for his own ADLs.  
Per patient he will return home to his prior living arrangements post discharge and family 
will transport him home. Patient informed me he has a nebulizer and home oxygen for home 
use. Patient stated he needed a new portable oxygen. Advised patient there is a Social 
Service order for oxygen at 2 l/min by continuous nasal cannula home. Informed patient he 
has a right to participate in all discharge planning. Patient verbalized understanding and 
agreed to discharge plan. Placed call to  Rubia with [facility]. KRISTEL Barkley advised me she followed up with SG and requested portable oxygen to be deliver to 
[facility] [address].  MAGDY Arias was informed. 

-------------------------------------------------------------------------------

Addendum: 04/20/20 at 1456 by IVETH ZIMMERMAN SS

-------------------------------------------------------------------------------

Amended: Links added.

## 2020-04-20 NOTE — NUR
Discharge



Discharge education and paperwork provided to the patient per MD order. Patient verbalized 
understanding. Instructed patient on scheduled follow up appointment and new prescription. 
patient verbalized understanding. Prescription faxed to [facility]. Instructed patient to 
use supplemental oxygen all the time per MD order. Patient stated "I don't need it. I'm not 
going to do it!" Respirations even and unlabored, no distress noted. IV removed with clean 
technique, catheter intact. Dressing applied. Patient tolerated well, no trauma to site. 
Telemonitor removed. Patient reports having all personal belongings. Patient requesting 
transportation home. Taxi voucher provided by [employer]

## 2020-04-20 NOTE — NUR
Morning note



Patient resting in bed with even and unlabored respirations, no distress noted. Instructed 
patient on POC, fall precautions and to call for assistance as needed. Patient verbalized 
understanding. Fall precautions in place with call light within reach.

## 2020-04-20 NOTE — NUR
IV insertion

IV access obtained, via clean sterile technique by inserting 22 gauge catheter at RIGHT 
FOREARM after 1 attempt(s). IV secured properly. No trauma to site. Patient tolerated well.

NOTE: 



IV removal

IV DC'd  ON LEFT FOREARM with clean sterile technique, catheter fully intact. Pressure 
dressing applied to site. Patient tolerated well.

NOTE:

## 2020-04-20 NOTE — NUR
Patient ambulated to nursing station requesting to leave



Informed patient that taxi company has not called that they have arrived. Patient stated "I 
don't care. I'll wait down stairs." Patient not wearing supplemental oxygen. Patient has 
labored breathing. Instructed patient to apply supplemental oxygen. Patient verbalized 
understanding. This RN applied supplemental oxygen. Patient's breathing improved. Patient 
instructed to  prescription at [facility] in the hospital lobby. Patient 
verbalized understanding. Patient placed in a wheelchair and taken to hospital lobby, 
accompanied by staff member. Respirations even and unlabored, no distress noted. Patient 
reports having all personal belongings.

## 2020-04-20 NOTE — NUR
RE: Inhaler prescriptions



[facility] notified this RN that patient is requesting a prescription for his home 
nebulizer and a prescription for his home Spiriva inhaler. This RN notified Dr. Ya. 
Order received and read back to verify. Patient requested prescriptions be called to his 
preferred pharmacy. This RN called 2 prescriptions into patient's preferred pharmacy.

## 2020-04-20 NOTE — NUR
Patient refusing portable supplemental oxygen



Patient threw his personal cell phone on the hospital bed and stated "Bullshit! I'm not 
taking it. You guys are wasting your time. I'll be fine. I'll use by oxygen that I have at 
home already. I'm not using it. I'm not going through that humiliation of carrying that 
thing around!" Education provided. Patient verbalized understanding and stated "I just have 
a little chest cold that has caused all of this! I'll stick with the oxygen that I have at 
home. I'll be fine. You guys are wasting your time. I'm not going to take it!"



Updated Dr. Ya. MD verbalized understanding. Patient to be discharged home per MD order.

## 2020-05-04 ENCOUNTER — HOSPITAL ENCOUNTER (INPATIENT)
Dept: HOSPITAL 15 - ER | Age: 70
LOS: 1 days | Discharge: HOME HEALTH | DRG: 190 | End: 2020-05-05
Attending: HOSPITALIST | Admitting: NURSE PRACTITIONER
Payer: COMMERCIAL

## 2020-05-04 VITALS — SYSTOLIC BLOOD PRESSURE: 155 MMHG | DIASTOLIC BLOOD PRESSURE: 93 MMHG

## 2020-05-04 VITALS — WEIGHT: 217.38 LBS | HEIGHT: 72 IN | BODY MASS INDEX: 29.44 KG/M2

## 2020-05-04 VITALS — SYSTOLIC BLOOD PRESSURE: 177 MMHG | DIASTOLIC BLOOD PRESSURE: 73 MMHG

## 2020-05-04 DIAGNOSIS — Z82.49: ICD-10-CM

## 2020-05-04 DIAGNOSIS — I10: ICD-10-CM

## 2020-05-04 DIAGNOSIS — Z20.828: ICD-10-CM

## 2020-05-04 DIAGNOSIS — Z82.3: ICD-10-CM

## 2020-05-04 DIAGNOSIS — J18.9: ICD-10-CM

## 2020-05-04 DIAGNOSIS — J45.901: ICD-10-CM

## 2020-05-04 DIAGNOSIS — J44.0: ICD-10-CM

## 2020-05-04 DIAGNOSIS — Z99.81: ICD-10-CM

## 2020-05-04 DIAGNOSIS — J96.10: ICD-10-CM

## 2020-05-04 DIAGNOSIS — J44.1: Primary | ICD-10-CM

## 2020-05-04 DIAGNOSIS — Z91.19: ICD-10-CM

## 2020-05-04 LAB
ALBUMIN SERPL-MCNC: 3.8 G/DL (ref 3.4–5)
ALP SERPL-CCNC: 69 U/L (ref 45–117)
ALT SERPL-CCNC: 35 U/L (ref 16–61)
ANION GAP SERPL CALCULATED.3IONS-SCNC: 12 MMOL/L (ref 5–15)
BILIRUB SERPL-MCNC: 0.7 MG/DL (ref 0.2–1)
BUN SERPL-MCNC: 27 MG/DL (ref 7–18)
BUN/CREAT SERPL: 24.1
CALCIUM SERPL-MCNC: 8.7 MG/DL (ref 8.5–10.1)
CHLORIDE SERPL-SCNC: 102 MMOL/L (ref 98–107)
CO2 SERPL-SCNC: 24 MMOL/L (ref 21–32)
GLUCOSE SERPL-MCNC: 74 MG/DL (ref 74–106)
HCT VFR BLD AUTO: 50.4 % (ref 41–53)
HGB BLD-MCNC: 16.7 G/DL (ref 13.5–17.5)
MAGNESIUM SERPL-MCNC: 2 MG/DL (ref 1.6–2.6)
MCH RBC QN AUTO: 34.7 PG (ref 28–32)
MCV RBC AUTO: 104.7 FL (ref 80–100)
NRBC BLD QL AUTO: 0.1 %
POTASSIUM SERPL-SCNC: 4.4 MMOL/L (ref 3.5–5.1)
PROT SERPL-MCNC: 7.7 G/DL (ref 6.4–8.2)
SODIUM SERPL-SCNC: 138 MMOL/L (ref 136–145)

## 2020-05-04 PROCEDURE — 87804 INFLUENZA ASSAY W/OPTIC: CPT

## 2020-05-04 PROCEDURE — 87040 BLOOD CULTURE FOR BACTERIA: CPT

## 2020-05-04 PROCEDURE — 85025 COMPLETE CBC W/AUTO DIFF WBC: CPT

## 2020-05-04 PROCEDURE — 94640 AIRWAY INHALATION TREATMENT: CPT

## 2020-05-04 PROCEDURE — 85379 FIBRIN DEGRADATION QUANT: CPT

## 2020-05-04 PROCEDURE — 36415 COLL VENOUS BLD VENIPUNCTURE: CPT

## 2020-05-04 PROCEDURE — 84484 ASSAY OF TROPONIN QUANT: CPT

## 2020-05-04 PROCEDURE — 82728 ASSAY OF FERRITIN: CPT

## 2020-05-04 PROCEDURE — 96365 THER/PROPH/DIAG IV INF INIT: CPT

## 2020-05-04 PROCEDURE — 80048 BASIC METABOLIC PNL TOTAL CA: CPT

## 2020-05-04 PROCEDURE — 83735 ASSAY OF MAGNESIUM: CPT

## 2020-05-04 PROCEDURE — 84443 ASSAY THYROID STIM HORMONE: CPT

## 2020-05-04 PROCEDURE — 87081 CULTURE SCREEN ONLY: CPT

## 2020-05-04 PROCEDURE — 83615 LACTATE (LD) (LDH) ENZYME: CPT

## 2020-05-04 PROCEDURE — 80053 COMPREHEN METABOLIC PANEL: CPT

## 2020-05-04 PROCEDURE — 96375 TX/PRO/DX INJ NEW DRUG ADDON: CPT

## 2020-05-04 PROCEDURE — 87880 STREP A ASSAY W/OPTIC: CPT

## 2020-05-04 PROCEDURE — 71045 X-RAY EXAM CHEST 1 VIEW: CPT

## 2020-05-04 PROCEDURE — 86141 C-REACTIVE PROTEIN HS: CPT

## 2020-05-04 PROCEDURE — 83605 ASSAY OF LACTIC ACID: CPT

## 2020-05-04 PROCEDURE — 87070 CULTURE OTHR SPECIMN AEROBIC: CPT

## 2020-05-04 RX ADMIN — METHYLPREDNISOLONE SODIUM SUCCINATE SCH MG: 125 INJECTION, POWDER, FOR SOLUTION INTRAMUSCULAR; INTRAVENOUS at 22:00

## 2020-05-04 RX ADMIN — ALBUTEROL SULFATE SCH MCG: 108 AEROSOL, METERED RESPIRATORY (INHALATION) at 22:00

## 2020-05-04 NOTE — NUR
Telemetry admit from ER

MILENA DANIELS admitted to Telemetry.  Patient oriented to TAHIR VARGAS RN primary RN, 
unit, room, bed, and unit policies regarding patient care and visiting hours. Patient now on 
continuous telemetry monitoring, tele box #6  and telemetry reading on arrival to unit is. 
Patient placed on bedside oxygen, and encouraged to call if they need something. All 
questions and concerns addressed, patient verbalized understanding. 

Note:

## 2020-05-05 VITALS — SYSTOLIC BLOOD PRESSURE: 158 MMHG | DIASTOLIC BLOOD PRESSURE: 74 MMHG

## 2020-05-05 VITALS — SYSTOLIC BLOOD PRESSURE: 186 MMHG | DIASTOLIC BLOOD PRESSURE: 94 MMHG

## 2020-05-05 VITALS — SYSTOLIC BLOOD PRESSURE: 153 MMHG | DIASTOLIC BLOOD PRESSURE: 78 MMHG

## 2020-05-05 VITALS — SYSTOLIC BLOOD PRESSURE: 186 MMHG | DIASTOLIC BLOOD PRESSURE: 93 MMHG

## 2020-05-05 VITALS — DIASTOLIC BLOOD PRESSURE: 74 MMHG | SYSTOLIC BLOOD PRESSURE: 158 MMHG

## 2020-05-05 LAB
ALBUMIN SERPL-MCNC: 3.4 G/DL (ref 3.4–5)
ALP SERPL-CCNC: 63 U/L (ref 45–117)
ALT SERPL-CCNC: 33 U/L (ref 16–61)
ANION GAP SERPL CALCULATED.3IONS-SCNC: 11 MMOL/L (ref 5–15)
ANION GAP SERPL CALCULATED.3IONS-SCNC: 9 MMOL/L (ref 5–15)
BILIRUB SERPL-MCNC: 0.7 MG/DL (ref 0.2–1)
BUN SERPL-MCNC: 31 MG/DL (ref 7–18)
BUN SERPL-MCNC: 39 MG/DL (ref 7–18)
BUN/CREAT SERPL: 24
BUN/CREAT SERPL: 26
CALCIUM SERPL-MCNC: 8.2 MG/DL (ref 8.5–10.1)
CALCIUM SERPL-MCNC: 8.6 MG/DL (ref 8.5–10.1)
CHLORIDE SERPL-SCNC: 101 MMOL/L (ref 98–107)
CHLORIDE SERPL-SCNC: 104 MMOL/L (ref 98–107)
CO2 SERPL-SCNC: 24 MMOL/L (ref 21–32)
CO2 SERPL-SCNC: 24 MMOL/L (ref 21–32)
GLUCOSE SERPL-MCNC: 283 MG/DL (ref 74–106)
GLUCOSE SERPL-MCNC: 303 MG/DL (ref 74–106)
HCT VFR BLD AUTO: 46 % (ref 41–53)
HGB BLD-MCNC: 15.6 G/DL (ref 13.5–17.5)
MCH RBC QN AUTO: 35.7 PG (ref 28–32)
MCV RBC AUTO: 105.1 FL (ref 80–100)
NRBC BLD QL AUTO: 0.1 %
POTASSIUM SERPL-SCNC: 4 MMOL/L (ref 3.5–5.1)
POTASSIUM SERPL-SCNC: 4.6 MMOL/L (ref 3.5–5.1)
PROT SERPL-MCNC: 7.1 G/DL (ref 6.4–8.2)
SODIUM SERPL-SCNC: 136 MMOL/L (ref 136–145)
SODIUM SERPL-SCNC: 137 MMOL/L (ref 136–145)

## 2020-05-05 RX ADMIN — ALBUTEROL SULFATE SCH MCG: 108 AEROSOL, METERED RESPIRATORY (INHALATION) at 05:44

## 2020-05-05 RX ADMIN — ALBUTEROL SULFATE SCH MCG: 108 AEROSOL, METERED RESPIRATORY (INHALATION) at 13:36

## 2020-05-05 RX ADMIN — ALBUTEROL SULFATE SCH MCG: 108 AEROSOL, METERED RESPIRATORY (INHALATION) at 14:10

## 2020-05-05 RX ADMIN — ALBUTEROL SULFATE SCH MCG: 108 AEROSOL, METERED RESPIRATORY (INHALATION) at 22:24

## 2020-05-05 RX ADMIN — METHYLPREDNISOLONE SODIUM SUCCINATE SCH MG: 125 INJECTION, POWDER, FOR SOLUTION INTRAMUSCULAR; INTRAVENOUS at 10:11

## 2020-05-05 NOTE — NUR
Respiratory note:

HR 75, RR 14, SPO2 92% ON 2 L NC, BS  CLEAR AND DIMINISHED. UNABLE TO ADMINISTER MDI. MDI 
NOT AVAILABLE FROM PHARMACY. WILL CALL PHARMACY. NO SIGNS OR SYMPTOMS OF RESPIRATORY 
DISTRESS NOTED AT THIS TIME

## 2020-05-05 NOTE — NUR
Patient transferred from [facility] to [address]. Patient brought to unit with all personal 
belongings, to room. Patient is alert and oriented to person, place, time, and situation. No 
complaints at this time. Patient oriented to room. Will continue to monitor.

## 2020-05-05 NOTE — NUR
IV insertion

IV access obtained, via clean sterile technique by inserting 20 gauge catheter at LAC after 
1 attempt(s). IV secured properly. No trauma to site. Patient tolerated well.

IV TO RAC REMOVED ACCIDENTLY BY PATIENT.

NOTE:

## 2020-05-05 NOTE — NUR
PT CHECKED FOR MDI. PT STATED HE SELF ADMINISTERED MDI WITH AERO CHAMBER. PT IS BEING D/C'D 
AND IS RESTING WITH NO ACUTE DISTRESS NOTED.

## 2020-05-05 NOTE — NUR
REGARDING OXYGEN:

PATIENT REPORTS USING HOME OXYGEN 2L NASAL CANNULA ON OCCASION. PATIENT TITRATED DOWN TO 1L 
NASAL CANNULA. OXYGEN SATURATIONS AT 95%.

## 2020-05-05 NOTE — NUR
Respiratory note:

HR 93, RR 16, SPO2 98% ON 2 L NC. MDI DONE BY RT,PT TOLERATED WELL. NO SIGNS OR SYMPTOMS OF 
RESPIRATORY DISTRESS NOTED AT THIS TIME.

## 2020-05-05 NOTE — NUR
Assessment



Patient is a 70-year-old male who is alert and oriented. Prior to admission patient lived 
home alone and functioned independently. Patient informed me he can care for his own ADLs.  
Per patient he will return home to his prior living arrangements post discharge and family 
will transport him home. Patient informed me he has a nebulizer and home oxygen for home 
use. Advised patient there is a social service consult for safety evaluation. Informed 
patient clinical information will be faxed to [facility]. Informed patient he 
has a right to participate in all discharge planning. Patient verbalized understanding and 
agreed to discharge plan. Placed call to  Rubia with [facility] to 
informed her there is an order for home health. [facility] 
[facility] and service to start within 24-48hrs upon d/c day.  

-------------------------------------------------------------------------------

Addendum: 05/05/20 at 1402 by IVETH HOROWITZ

-------------------------------------------------------------------------------

Amended: Links added.

## 2020-05-05 NOTE — NUR
Spoke with Dr. Delgadillo. Elie stated that he is stable from his standpoint and cleared for 
discharge.

## 2020-05-05 NOTE — NUR
Patient leaving unit at this time with taxi voucher and oxygen tank. Patient being taken 
down to taxi via wheelchair. Patient alert, oriented and aware of discharge. IV discontinued 
and all bands taken off. Patient educated and all questions answered.

## 2020-05-05 NOTE — NUR
Opening Shift Note

Assumed care of patient, awake and alert. No S/S of distress/SOB or pain. Instructed on POC 
and to call for assist PRN, will continue to monitor for changes Q1hr and PRN. Bed locked in 
lowest position and bed rails up x2. Call light within reach.

## 2020-05-05 NOTE — NUR
Respiratory note:

ATTEMPTING TO REACH DR VI MATHEW TO INQUIRE ABOUT DISCONTINUING DUPLICATE MED NEB ORDERS. 
CALL PLACED, AWAITING CALL BACK.

## 2020-06-19 ENCOUNTER — HOSPITAL ENCOUNTER (EMERGENCY)
Dept: HOSPITAL 15 - ER | Age: 70
Discharge: HOME | End: 2020-06-19
Payer: COMMERCIAL

## 2020-06-19 VITALS — SYSTOLIC BLOOD PRESSURE: 140 MMHG | DIASTOLIC BLOOD PRESSURE: 71 MMHG

## 2020-06-19 VITALS — BODY MASS INDEX: 28.44 KG/M2 | HEIGHT: 72 IN | WEIGHT: 210 LBS

## 2020-06-19 DIAGNOSIS — J44.1: Primary | ICD-10-CM

## 2020-06-19 LAB
ALBUMIN SERPL-MCNC: 3.7 G/DL (ref 3.4–5)
ALP SERPL-CCNC: 68 U/L (ref 45–117)
ALT SERPL-CCNC: 21 U/L (ref 16–61)
ANION GAP SERPL CALCULATED.3IONS-SCNC: 7 MMOL/L (ref 5–15)
BILIRUB SERPL-MCNC: 0.8 MG/DL (ref 0.2–1)
BUN SERPL-MCNC: 18 MG/DL (ref 7–18)
BUN/CREAT SERPL: 13.4
CALCIUM SERPL-MCNC: 8.5 MG/DL (ref 8.5–10.1)
CHLORIDE SERPL-SCNC: 108 MMOL/L (ref 98–107)
CO2 SERPL-SCNC: 26 MMOL/L (ref 21–32)
GLUCOSE SERPL-MCNC: 121 MG/DL (ref 74–106)
HCT VFR BLD AUTO: 44 % (ref 41–53)
HGB BLD-MCNC: 14.8 G/DL (ref 13.5–17.5)
MCH RBC QN AUTO: 35.2 PG (ref 28–32)
MCV RBC AUTO: 104.5 FL (ref 80–100)
NRBC BLD QL AUTO: 0.2 %
POTASSIUM SERPL-SCNC: 4.1 MMOL/L (ref 3.5–5.1)
PROT SERPL-MCNC: 6.9 G/DL (ref 6.4–8.2)
SODIUM SERPL-SCNC: 141 MMOL/L (ref 136–145)

## 2020-06-19 PROCEDURE — 94640 AIRWAY INHALATION TREATMENT: CPT

## 2020-06-19 PROCEDURE — 85025 COMPLETE CBC W/AUTO DIFF WBC: CPT

## 2020-06-19 PROCEDURE — 36415 COLL VENOUS BLD VENIPUNCTURE: CPT

## 2020-06-19 PROCEDURE — 80053 COMPREHEN METABOLIC PANEL: CPT

## 2020-06-19 PROCEDURE — 93005 ELECTROCARDIOGRAM TRACING: CPT

## 2020-06-19 PROCEDURE — 71045 X-RAY EXAM CHEST 1 VIEW: CPT

## 2020-06-19 PROCEDURE — 96374 THER/PROPH/DIAG INJ IV PUSH: CPT

## 2020-06-19 PROCEDURE — 99283 EMERGENCY DEPT VISIT LOW MDM: CPT

## 2020-07-06 ENCOUNTER — HOSPITAL ENCOUNTER (INPATIENT)
Dept: HOSPITAL 15 - ER | Age: 70
LOS: 2 days | Discharge: HOME | DRG: 189 | End: 2020-07-08
Attending: INTERNAL MEDICINE | Admitting: NURSE PRACTITIONER
Payer: COMMERCIAL

## 2020-07-06 VITALS — HEIGHT: 72 IN | BODY MASS INDEX: 26.81 KG/M2 | WEIGHT: 197.98 LBS

## 2020-07-06 DIAGNOSIS — Z20.828: ICD-10-CM

## 2020-07-06 DIAGNOSIS — J96.21: Primary | ICD-10-CM

## 2020-07-06 DIAGNOSIS — J44.1: ICD-10-CM

## 2020-07-06 DIAGNOSIS — Z59.0: ICD-10-CM

## 2020-07-06 DIAGNOSIS — Z72.89: ICD-10-CM

## 2020-07-06 DIAGNOSIS — Z82.3: ICD-10-CM

## 2020-07-06 DIAGNOSIS — F17.210: ICD-10-CM

## 2020-07-06 DIAGNOSIS — I10: ICD-10-CM

## 2020-07-06 DIAGNOSIS — E66.9: ICD-10-CM

## 2020-07-06 LAB
ALBUMIN SERPL-MCNC: 3.5 G/DL (ref 3.4–5)
ALP SERPL-CCNC: 73 U/L (ref 45–117)
ALT SERPL-CCNC: 17 U/L (ref 16–61)
ANION GAP SERPL CALCULATED.3IONS-SCNC: 3 MMOL/L (ref 5–15)
BILIRUB SERPL-MCNC: 0.5 MG/DL (ref 0.2–1)
BUN SERPL-MCNC: 12 MG/DL (ref 7–18)
BUN/CREAT SERPL: 10.5
CALCIUM SERPL-MCNC: 8.7 MG/DL (ref 8.5–10.1)
CHLORIDE SERPL-SCNC: 108 MMOL/L (ref 98–107)
CO2 SERPL-SCNC: 30 MMOL/L (ref 21–32)
GLUCOSE SERPL-MCNC: 121 MG/DL (ref 74–106)
HCT VFR BLD AUTO: 45.5 % (ref 41–53)
HGB BLD-MCNC: 15.8 G/DL (ref 13.5–17.5)
MCH RBC QN AUTO: 36.3 PG (ref 28–32)
MCV RBC AUTO: 104.5 FL (ref 80–100)
NRBC BLD QL AUTO: 0.1 %
POTASSIUM SERPL-SCNC: 4 MMOL/L (ref 3.5–5.1)
PROT SERPL-MCNC: 7.2 G/DL (ref 6.4–8.2)
SODIUM SERPL-SCNC: 141 MMOL/L (ref 136–145)

## 2020-07-06 PROCEDURE — 71275 CT ANGIOGRAPHY CHEST: CPT

## 2020-07-06 PROCEDURE — 93005 ELECTROCARDIOGRAM TRACING: CPT

## 2020-07-06 PROCEDURE — 80053 COMPREHEN METABOLIC PANEL: CPT

## 2020-07-06 PROCEDURE — 83615 LACTATE (LD) (LDH) ENZYME: CPT

## 2020-07-06 PROCEDURE — 94640 AIRWAY INHALATION TREATMENT: CPT

## 2020-07-06 PROCEDURE — 93970 EXTREMITY STUDY: CPT

## 2020-07-06 PROCEDURE — 87081 CULTURE SCREEN ONLY: CPT

## 2020-07-06 PROCEDURE — 36415 COLL VENOUS BLD VENIPUNCTURE: CPT

## 2020-07-06 PROCEDURE — 83605 ASSAY OF LACTIC ACID: CPT

## 2020-07-06 PROCEDURE — 71045 X-RAY EXAM CHEST 1 VIEW: CPT

## 2020-07-06 PROCEDURE — 84484 ASSAY OF TROPONIN QUANT: CPT

## 2020-07-06 PROCEDURE — 80048 BASIC METABOLIC PNL TOTAL CA: CPT

## 2020-07-06 PROCEDURE — 96374 THER/PROPH/DIAG INJ IV PUSH: CPT

## 2020-07-06 PROCEDURE — 87804 INFLUENZA ASSAY W/OPTIC: CPT

## 2020-07-06 PROCEDURE — 85379 FIBRIN DEGRADATION QUANT: CPT

## 2020-07-06 PROCEDURE — 82728 ASSAY OF FERRITIN: CPT

## 2020-07-06 PROCEDURE — 85025 COMPLETE CBC W/AUTO DIFF WBC: CPT

## 2020-07-06 PROCEDURE — 83880 ASSAY OF NATRIURETIC PEPTIDE: CPT

## 2020-07-06 SDOH — ECONOMIC STABILITY - HOUSING INSECURITY: HOMELESSNESS: Z59.0

## 2020-07-07 VITALS — DIASTOLIC BLOOD PRESSURE: 68 MMHG | SYSTOLIC BLOOD PRESSURE: 154 MMHG

## 2020-07-07 VITALS — DIASTOLIC BLOOD PRESSURE: 73 MMHG | SYSTOLIC BLOOD PRESSURE: 162 MMHG

## 2020-07-07 VITALS — DIASTOLIC BLOOD PRESSURE: 85 MMHG | SYSTOLIC BLOOD PRESSURE: 181 MMHG

## 2020-07-07 VITALS — DIASTOLIC BLOOD PRESSURE: 75 MMHG | SYSTOLIC BLOOD PRESSURE: 133 MMHG

## 2020-07-07 VITALS — DIASTOLIC BLOOD PRESSURE: 93 MMHG | SYSTOLIC BLOOD PRESSURE: 156 MMHG

## 2020-07-07 VITALS — SYSTOLIC BLOOD PRESSURE: 156 MMHG | DIASTOLIC BLOOD PRESSURE: 93 MMHG

## 2020-07-07 VITALS — DIASTOLIC BLOOD PRESSURE: 88 MMHG | SYSTOLIC BLOOD PRESSURE: 151 MMHG

## 2020-07-07 LAB
ANION GAP SERPL CALCULATED.3IONS-SCNC: 3 MMOL/L (ref 5–15)
BUN SERPL-MCNC: 14 MG/DL (ref 7–18)
BUN/CREAT SERPL: 13.1
CALCIUM SERPL-MCNC: 9 MG/DL (ref 8.5–10.1)
CHLORIDE SERPL-SCNC: 104 MMOL/L (ref 98–107)
CO2 SERPL-SCNC: 30 MMOL/L (ref 21–32)
GLUCOSE SERPL-MCNC: 135 MG/DL (ref 74–106)
HCT VFR BLD AUTO: 48.9 % (ref 41–53)
HGB BLD-MCNC: 16.2 G/DL (ref 13.5–17.5)
MCH RBC QN AUTO: 34.5 PG (ref 28–32)
MCV RBC AUTO: 103.8 FL (ref 80–100)
NRBC BLD QL AUTO: 0.3 %
POTASSIUM SERPL-SCNC: 4.8 MMOL/L (ref 3.5–5.1)
SODIUM SERPL-SCNC: 137 MMOL/L (ref 136–145)

## 2020-07-07 RX ADMIN — IPRATROPIUM BROMIDE PRN MG: 0.5 SOLUTION RESPIRATORY (INHALATION) at 19:07

## 2020-07-07 RX ADMIN — FAMOTIDINE SCH MG: 20 TABLET, FILM COATED ORAL at 09:06

## 2020-07-07 RX ADMIN — IPRATROPIUM BROMIDE PRN MG: 0.5 SOLUTION RESPIRATORY (INHALATION) at 12:16

## 2020-07-07 RX ADMIN — ALBUTEROL SULFATE PRN MG: 2.5 SOLUTION RESPIRATORY (INHALATION) at 12:16

## 2020-07-07 RX ADMIN — FAMOTIDINE SCH MG: 20 TABLET, FILM COATED ORAL at 22:12

## 2020-07-07 RX ADMIN — ENOXAPARIN SODIUM SCH MG: 40 INJECTION SUBCUTANEOUS at 09:09

## 2020-07-07 RX ADMIN — METOPROLOL TARTRATE SCH MG: 25 TABLET, FILM COATED ORAL at 09:05

## 2020-07-07 RX ADMIN — IPRATROPIUM BROMIDE PRN MG: 0.5 SOLUTION RESPIRATORY (INHALATION) at 22:03

## 2020-07-07 RX ADMIN — METOPROLOL TARTRATE SCH MG: 25 TABLET, FILM COATED ORAL at 22:12

## 2020-07-07 RX ADMIN — ALBUTEROL SULFATE PRN MG: 2.5 SOLUTION RESPIRATORY (INHALATION) at 22:03

## 2020-07-07 RX ADMIN — ALBUTEROL SULFATE PRN MG: 2.5 SOLUTION RESPIRATORY (INHALATION) at 19:07

## 2020-07-07 NOTE — NUR
Opening shift noted

Assumed care of patient from NOC MAGDY Kaur. Patient is AOX4 no s/s of distress noted. Bed is 
in lowest locked position, side rails up x2, and call light is within reach. Updated patient 
on plan of care and patient verbalized understanding. Will continue to monitor Q1hr and PRN.

## 2020-07-07 NOTE — NUR
Report given to MAGDY Nieto. Updated MAGDY Nieto that patient is feeling SOB. Vitals are 164/97, 98% 
oxygenation saturation on 4L of oxygen via nasal cannula, and heart rate is 82 BPM. Patient 
does not have PRN breathing treatments and respiratory is aware.

## 2020-07-07 NOTE — NUR
Respiratory note:

SCHEDULE MDI TX NOT AVAILABLE AT THIS TIME. WILL CONTACT PHARMACY TO OBTAIN MDI FOR 1400 TX.

## 2020-07-07 NOTE — NUR
Assessment



Patient is a 70-year-old male who is alert and oriented. Patient did  not cooperate well 
when assessing him. Patient informed me he has been going through a lot. Patient informed me 
he will be going to a shelter tomorrow but has no where to go tonight. Patient informed me 
he is having difficulties breathing and does not feel safe to be discharge tonight. Provided 
information to clothes closet and meal prior to discharge.  Patient accepted resources.  
Offered patient taxi voucher within 30 miles and pt agreed. Completed homeless assessment 
and patient signed homeless waiver. Will follow-up and provide intervention as appropriate. 
Informed MAGDY Manuel. 

-------------------------------------------------------------------------------

Addendum: 07/07/20 at 1800 by IVETH HOROWITZ

-------------------------------------------------------------------------------

Amended: Links added.

## 2020-07-07 NOTE — NUR
PAGED MD DG MATHEW 

REGARDING DISCHARGE HOLD. PER  IVETH, THERE IS NO SHELTER AVAILABLE FOR 
TONIGHT, PATIENT WOULD HAVE TO BE DISCHARGED TOMORROW MORNING. WILL UPDATE MD WHEN HE 
RETURNS CALL.

## 2020-07-07 NOTE — NUR
TRANSFER FROM [facility]

PATIENT TRANSFERRED TO BED VIA WHEELCHAIR. NO S/S OF DISTRESS, PATIENT C/O OF SOB CONNECTED 
TO 4L O2 VIA NC, INFORMED PATIENT THAT DR WAS PAGED FOR BREATHING TREATMENTS. BED LOCKED IN 
LOWEST POSITION, SIDE RAILS UP X2, CALL LIGHT WITHIN REACH. WILL CONTINUE TO MONITOR.

## 2020-07-07 NOTE — NUR
IV insertion

IV access obtained, via clean sterile technique by inserting 20 gauge catheter at RIGHT 
FOREARM after 1 attempt. IV secured properly. No trauma to site. Patient tolerated well.

NOTE: []

## 2020-07-07 NOTE — NUR
Telemetry admit from ER

MILENA DANIELS admitted to Telemetry unit.  Patient oriented to EVER HIRSCH, primary RN, 
unit, room, bed, and unit policies regarding patient care and visiting hours. Patient now on 
continuous telemetry monitoring, tele box 16  and telemetry reading on arrival to unit is 
sinus rhythm. Patient placed on bedside oxygen 4l nasal cannula , weighed by bedscale and 
encouraged to call if they need something. All questions and concerns addressed, patient 
verbalized understanding

## 2020-07-08 VITALS — DIASTOLIC BLOOD PRESSURE: 60 MMHG | SYSTOLIC BLOOD PRESSURE: 131 MMHG

## 2020-07-08 VITALS — DIASTOLIC BLOOD PRESSURE: 75 MMHG | SYSTOLIC BLOOD PRESSURE: 126 MMHG

## 2020-07-08 VITALS — SYSTOLIC BLOOD PRESSURE: 141 MMHG | DIASTOLIC BLOOD PRESSURE: 97 MMHG

## 2020-07-08 LAB
ANION GAP SERPL CALCULATED.3IONS-SCNC: 1 MMOL/L (ref 5–15)
BUN SERPL-MCNC: 28 MG/DL (ref 7–18)
BUN/CREAT SERPL: 22.8
CALCIUM SERPL-MCNC: 8.3 MG/DL (ref 8.5–10.1)
CHLORIDE SERPL-SCNC: 106 MMOL/L (ref 98–107)
CO2 SERPL-SCNC: 33 MMOL/L (ref 21–32)
GLUCOSE SERPL-MCNC: 122 MG/DL (ref 74–106)
HCT VFR BLD AUTO: 42 % (ref 41–53)
HGB BLD-MCNC: 14.2 G/DL (ref 13.5–17.5)
MCH RBC QN AUTO: 34.9 PG (ref 28–32)
MCV RBC AUTO: 103.6 FL (ref 80–100)
NRBC BLD QL AUTO: 0 %
POTASSIUM SERPL-SCNC: 3.9 MMOL/L (ref 3.5–5.1)
SODIUM SERPL-SCNC: 140 MMOL/L (ref 136–145)

## 2020-07-08 RX ADMIN — METOPROLOL TARTRATE SCH MG: 25 TABLET, FILM COATED ORAL at 10:44

## 2020-07-08 RX ADMIN — FAMOTIDINE SCH MG: 20 TABLET, FILM COATED ORAL at 10:43

## 2020-07-08 RX ADMIN — ALBUTEROL SULFATE PRN MG: 2.5 SOLUTION RESPIRATORY (INHALATION) at 02:53

## 2020-07-08 RX ADMIN — IPRATROPIUM BROMIDE PRN MG: 0.5 SOLUTION RESPIRATORY (INHALATION) at 02:53

## 2020-07-08 RX ADMIN — IPRATROPIUM BROMIDE PRN MG: 0.5 SOLUTION RESPIRATORY (INHALATION) at 08:37

## 2020-07-08 RX ADMIN — ALBUTEROL SULFATE PRN MG: 2.5 SOLUTION RESPIRATORY (INHALATION) at 08:37

## 2020-07-08 RX ADMIN — ENOXAPARIN SODIUM SCH MG: 40 INJECTION SUBCUTANEOUS at 10:45

## 2020-07-08 NOTE — NUR
DR. IBARRA AT BEDSIDE CONSULTED WITH PATIENT AT REQUEST OF DR DG MATHEW FOR PATIENT'S 
RHYTHM CHANGE THIS MORNING. INFORMED HIM THAT PATIENT HAD NOT HAD ANY COMPLAINTS OF SOB, CP, 
OR DIZZINESS FOR ME TODAY. DR IBARRA GAVE VERBAL CLEARANCE THAT THE PATIENT COULD DISCHARGE 
TODAY AND SHOULD FOLLOW UP HIM IN FOUR WEEKS FOR A CARDIAC STRESS TEST. PATIENT TOOK DR IBARRA'S CARD AND VERBALIZED UNDERSTANDING.

## 2020-07-08 NOTE — NUR
SPOKE WITH ROGELIO  APPROXIMATELY 1230 ABOUT THE PATIENT WHO HAS EXPRESSED TO ME THAT HE IS 
UNABLE TO FIND AN ACCEPTING SHELTER AND HE IS CONCERNED THAT IF A TAXI DROPS HIM OFF AT ONE 
AND THEY DON'T ACCEPT HIM WHAT HE WILL DO THEN. ROGELIO INFORMED ME SHE WOULD EMAIL IVETH TO 
INQUIRE WHO MIGHT HAVE ROOM AT THIS TIME AND THAT ALSO ALL SHELTERS ARE FIRST COME FIRST 
SERVE AWAITING TO HEAR BACK FROM .

## 2020-07-08 NOTE — NUR
SPOKE WITH MD ABOUT PATIENT'S RHYTHM, PHARMACY AND CONTACT WITH  REGARDING 
THE PATIENT'S CASE. MD INFORMED ME HE WOULD LIKE THE PATIENT TO F/U W DR ZARAGOZA AND HE WOULD 
CALL ME BACK. UPDATED PATIENT,HE IS AWARE.

## 2020-07-08 NOTE — NUR
LEFT MESSAGE WITH DR DG MATHEW ABOUT PATIENT PRESCRIPTION AND SS WORKING ON PLACEMENT AS 
WELL AS PREVIOUS CONCERNS. AWAITING RETURN PHONE CALL.

## 2020-07-08 NOTE — NUR
PATIENT IS AWAITING D/C TO [facility]. TAXI VOUCHER IS IN PATIENTS CHART. DC INFORMATION ON F/U, 
DIET, ACTIVITY, MEDICATIONS ALL BEEN GIVEN TO PATIENT. INSTRUCTED TO RETURN TO ED IF S/S 
WORSEN. TELE REMOVED. IV REMOVED AND DRESSING PLACED NO OVERT S/S BLEEDING. PATIENT 
VERBALIZED UNDERSTANDING ABOUT THE EDUCATION THAT HAS BEEN RECEIVED. PATIENT IN A/OX4 AND VS 
WNL 99.1, 81HR, 20. 136/75, 91% ON ROOM AIR. WAITING FOR [facility] TO BRING UP PATIENTS 
MEDICATION. SPOKE WITH PHARMACY KARLEE AND THEY INFORMED ME THEY WOULD CALL ME BACK AND 
THEN BRING THE PATIENT HIS MEDICATION.

## 2021-08-07 ENCOUNTER — HOSPITAL ENCOUNTER (INPATIENT)
Dept: HOSPITAL 15 - ER | Age: 71
LOS: 4 days | Discharge: HOSPICE | DRG: 189 | End: 2021-08-11
Attending: INTERNAL MEDICINE | Admitting: NURSE PRACTITIONER
Payer: COMMERCIAL

## 2021-08-07 VITALS — SYSTOLIC BLOOD PRESSURE: 135 MMHG | DIASTOLIC BLOOD PRESSURE: 84 MMHG

## 2021-08-07 VITALS — DIASTOLIC BLOOD PRESSURE: 80 MMHG | SYSTOLIC BLOOD PRESSURE: 121 MMHG

## 2021-08-07 VITALS — SYSTOLIC BLOOD PRESSURE: 123 MMHG | DIASTOLIC BLOOD PRESSURE: 48 MMHG

## 2021-08-07 VITALS — BODY MASS INDEX: 28.16 KG/M2 | HEIGHT: 72 IN | WEIGHT: 207.9 LBS

## 2021-08-07 VITALS — SYSTOLIC BLOOD PRESSURE: 118 MMHG | DIASTOLIC BLOOD PRESSURE: 68 MMHG

## 2021-08-07 DIAGNOSIS — J98.11: ICD-10-CM

## 2021-08-07 DIAGNOSIS — I11.0: ICD-10-CM

## 2021-08-07 DIAGNOSIS — E44.0: ICD-10-CM

## 2021-08-07 DIAGNOSIS — G47.30: ICD-10-CM

## 2021-08-07 DIAGNOSIS — Z66: ICD-10-CM

## 2021-08-07 DIAGNOSIS — Z20.822: ICD-10-CM

## 2021-08-07 DIAGNOSIS — I48.92: ICD-10-CM

## 2021-08-07 DIAGNOSIS — J96.21: ICD-10-CM

## 2021-08-07 DIAGNOSIS — Z79.899: ICD-10-CM

## 2021-08-07 DIAGNOSIS — J96.22: Primary | ICD-10-CM

## 2021-08-07 DIAGNOSIS — Z51.5: ICD-10-CM

## 2021-08-07 DIAGNOSIS — Z79.01: ICD-10-CM

## 2021-08-07 DIAGNOSIS — J44.1: ICD-10-CM

## 2021-08-07 LAB
ALBUMIN SERPL-MCNC: 2.9 G/DL (ref 3.4–5)
ALP SERPL-CCNC: 87 U/L (ref 45–117)
ALT SERPL-CCNC: 46 U/L (ref 16–61)
ANION GAP SERPL CALCULATED.3IONS-SCNC: 0 MMOL/L (ref 5–15)
BILIRUB SERPL-MCNC: 0.5 MG/DL (ref 0.2–1)
BUN SERPL-MCNC: 28 MG/DL (ref 7–18)
BUN/CREAT SERPL: 35.4
CALCIUM SERPL-MCNC: 8.6 MG/DL (ref 8.5–10.1)
CHLORIDE SERPL-SCNC: 100 MMOL/L (ref 98–107)
CO2 SERPL-SCNC: 39 MMOL/L (ref 21–32)
GLUCOSE SERPL-MCNC: 132 MG/DL (ref 74–106)
HCT VFR BLD AUTO: 40.9 % (ref 41–53)
HGB BLD-MCNC: 13.4 G/DL (ref 13.5–17.5)
MCH RBC QN AUTO: 30.4 PG (ref 28–32)
MCV RBC AUTO: 92.6 FL (ref 80–100)
NRBC BLD QL AUTO: 0 %
POTASSIUM SERPL-SCNC: 4.9 MMOL/L (ref 3.5–5.1)
PROT SERPL-MCNC: 6.9 G/DL (ref 6.4–8.2)
SODIUM SERPL-SCNC: 139 MMOL/L (ref 136–145)

## 2021-08-07 PROCEDURE — 96372 THER/PROPH/DIAG INJ SC/IM: CPT

## 2021-08-07 PROCEDURE — 87081 CULTURE SCREEN ONLY: CPT

## 2021-08-07 PROCEDURE — 36600 WITHDRAWAL OF ARTERIAL BLOOD: CPT

## 2021-08-07 PROCEDURE — 96365 THER/PROPH/DIAG IV INF INIT: CPT

## 2021-08-07 PROCEDURE — 85025 COMPLETE CBC W/AUTO DIFF WBC: CPT

## 2021-08-07 PROCEDURE — 96375 TX/PRO/DX INJ NEW DRUG ADDON: CPT

## 2021-08-07 PROCEDURE — 96367 TX/PROPH/DG ADDL SEQ IV INF: CPT

## 2021-08-07 PROCEDURE — 83735 ASSAY OF MAGNESIUM: CPT

## 2021-08-07 PROCEDURE — 87040 BLOOD CULTURE FOR BACTERIA: CPT

## 2021-08-07 PROCEDURE — 81001 URINALYSIS AUTO W/SCOPE: CPT

## 2021-08-07 PROCEDURE — 83880 ASSAY OF NATRIURETIC PEPTIDE: CPT

## 2021-08-07 PROCEDURE — 85007 BL SMEAR W/DIFF WBC COUNT: CPT

## 2021-08-07 PROCEDURE — 96361 HYDRATE IV INFUSION ADD-ON: CPT

## 2021-08-07 PROCEDURE — 96376 TX/PRO/DX INJ SAME DRUG ADON: CPT

## 2021-08-07 PROCEDURE — 80202 ASSAY OF VANCOMYCIN: CPT

## 2021-08-07 PROCEDURE — 80053 COMPREHEN METABOLIC PANEL: CPT

## 2021-08-07 PROCEDURE — 85027 COMPLETE CBC AUTOMATED: CPT

## 2021-08-07 PROCEDURE — 80048 BASIC METABOLIC PNL TOTAL CA: CPT

## 2021-08-07 PROCEDURE — 94640 AIRWAY INHALATION TREATMENT: CPT

## 2021-08-07 PROCEDURE — 93005 ELECTROCARDIOGRAM TRACING: CPT

## 2021-08-07 PROCEDURE — 82805 BLOOD GASES W/O2 SATURATION: CPT

## 2021-08-07 PROCEDURE — 94660 CPAP INITIATION&MGMT: CPT

## 2021-08-07 PROCEDURE — 71045 X-RAY EXAM CHEST 1 VIEW: CPT

## 2021-08-07 PROCEDURE — 99291 CRITICAL CARE FIRST HOUR: CPT

## 2021-08-07 PROCEDURE — 36415 COLL VENOUS BLD VENIPUNCTURE: CPT

## 2021-08-07 PROCEDURE — 85379 FIBRIN DEGRADATION QUANT: CPT

## 2021-08-07 PROCEDURE — 96366 THER/PROPH/DIAG IV INF ADDON: CPT

## 2021-08-07 PROCEDURE — 87426 SARSCOV CORONAVIRUS AG IA: CPT

## 2021-08-07 PROCEDURE — 84484 ASSAY OF TROPONIN QUANT: CPT

## 2021-08-07 PROCEDURE — 83605 ASSAY OF LACTIC ACID: CPT

## 2021-08-07 RX ADMIN — ALBUTEROL SULFATE SCH MG: 2.5 SOLUTION RESPIRATORY (INHALATION) at 19:06

## 2021-08-07 RX ADMIN — BUDESONIDE SCH MG: 0.5 SUSPENSION RESPIRATORY (INHALATION) at 19:06

## 2021-08-07 RX ADMIN — IPRATROPIUM BROMIDE SCH MG: 0.5 SOLUTION RESPIRATORY (INHALATION) at 19:06

## 2021-08-07 RX ADMIN — METHYLPREDNISOLONE SODIUM SUCCINATE SCH MG: 125 INJECTION, POWDER, FOR SOLUTION INTRAMUSCULAR; INTRAVENOUS at 22:30

## 2021-08-07 RX ADMIN — PIPERACILLIN SODIUM AND TAZOBACTAM SODIUM SCH MLS/HR: .375; 3 INJECTION, POWDER, LYOPHILIZED, FOR SOLUTION INTRAVENOUS at 22:30

## 2021-08-08 VITALS — SYSTOLIC BLOOD PRESSURE: 108 MMHG | DIASTOLIC BLOOD PRESSURE: 71 MMHG

## 2021-08-08 LAB
ANION GAP SERPL CALCULATED.3IONS-SCNC: 2 MMOL/L (ref 5–15)
BUN SERPL-MCNC: 36 MG/DL (ref 7–18)
BUN/CREAT SERPL: 36.7
CALCIUM SERPL-MCNC: 7.9 MG/DL (ref 8.5–10.1)
CHLORIDE SERPL-SCNC: 102 MMOL/L (ref 98–107)
CO2 SERPL-SCNC: 33 MMOL/L (ref 21–32)
GLUCOSE SERPL-MCNC: 376 MG/DL (ref 74–106)
HCT VFR BLD AUTO: 38.5 % (ref 41–53)
HGB BLD-MCNC: 12.5 G/DL (ref 13.5–17.5)
MCH RBC QN AUTO: 30.9 PG (ref 28–32)
MCV RBC AUTO: 94.9 FL (ref 80–100)
POTASSIUM SERPL-SCNC: 4.7 MMOL/L (ref 3.5–5.1)
SODIUM SERPL-SCNC: 137 MMOL/L (ref 136–145)

## 2021-08-08 PROCEDURE — 05HD33Z INSERTION OF INFUSION DEVICE INTO RIGHT CEPHALIC VEIN, PERCUTANEOUS APPROACH: ICD-10-PCS | Performed by: INTERNAL MEDICINE

## 2021-08-08 PROCEDURE — B54MZZA ULTRASONOGRAPHY OF RIGHT UPPER EXTREMITY VEINS, GUIDANCE: ICD-10-PCS | Performed by: INTERNAL MEDICINE

## 2021-08-08 RX ADMIN — PIPERACILLIN SODIUM AND TAZOBACTAM SODIUM SCH MLS/HR: .375; 3 INJECTION, POWDER, LYOPHILIZED, FOR SOLUTION INTRAVENOUS at 06:00

## 2021-08-08 RX ADMIN — IPRATROPIUM BROMIDE SCH MG: 0.5 SOLUTION RESPIRATORY (INHALATION) at 12:53

## 2021-08-08 RX ADMIN — ENOXAPARIN SODIUM SCH MG: 40 INJECTION SUBCUTANEOUS at 10:21

## 2021-08-08 RX ADMIN — METOPROLOL TARTRATE SCH MG: 25 TABLET, FILM COATED ORAL at 22:20

## 2021-08-08 RX ADMIN — IPRATROPIUM BROMIDE SCH MG: 0.5 SOLUTION RESPIRATORY (INHALATION) at 03:36

## 2021-08-08 RX ADMIN — GABAPENTIN SCH MG: 100 CAPSULE ORAL at 14:11

## 2021-08-08 RX ADMIN — IPRATROPIUM BROMIDE SCH MG: 0.5 SOLUTION RESPIRATORY (INHALATION) at 18:48

## 2021-08-08 RX ADMIN — ALBUTEROL SULFATE SCH MG: 2.5 SOLUTION RESPIRATORY (INHALATION) at 18:48

## 2021-08-08 RX ADMIN — METHYLPREDNISOLONE SODIUM SUCCINATE SCH MG: 125 INJECTION, POWDER, FOR SOLUTION INTRAMUSCULAR; INTRAVENOUS at 10:21

## 2021-08-08 RX ADMIN — AMIODARONE HYDROCHLORIDE SCH MLS/HR: 50 INJECTION, SOLUTION INTRAVENOUS at 16:39

## 2021-08-08 RX ADMIN — ALBUTEROL SULFATE SCH MG: 2.5 SOLUTION RESPIRATORY (INHALATION) at 03:36

## 2021-08-08 RX ADMIN — ALBUTEROL SULFATE SCH MG: 2.5 SOLUTION RESPIRATORY (INHALATION) at 12:53

## 2021-08-08 RX ADMIN — BUDESONIDE SCH MG: 0.5 SUSPENSION RESPIRATORY (INHALATION) at 18:48

## 2021-08-08 RX ADMIN — PIPERACILLIN SODIUM AND TAZOBACTAM SODIUM SCH MLS/HR: .375; 3 INJECTION, POWDER, LYOPHILIZED, FOR SOLUTION INTRAVENOUS at 22:20

## 2021-08-08 RX ADMIN — BUDESONIDE SCH MG: 0.5 SUSPENSION RESPIRATORY (INHALATION) at 10:00

## 2021-08-08 RX ADMIN — PIPERACILLIN SODIUM AND TAZOBACTAM SODIUM SCH MLS/HR: .375; 3 INJECTION, POWDER, LYOPHILIZED, FOR SOLUTION INTRAVENOUS at 14:17

## 2021-08-08 RX ADMIN — GABAPENTIN SCH MG: 100 CAPSULE ORAL at 22:21

## 2021-08-08 RX ADMIN — METHYLPREDNISOLONE SODIUM SUCCINATE SCH MG: 125 INJECTION, POWDER, FOR SOLUTION INTRAMUSCULAR; INTRAVENOUS at 22:20

## 2021-08-09 VITALS — DIASTOLIC BLOOD PRESSURE: 93 MMHG | SYSTOLIC BLOOD PRESSURE: 127 MMHG

## 2021-08-09 VITALS — SYSTOLIC BLOOD PRESSURE: 123 MMHG | DIASTOLIC BLOOD PRESSURE: 80 MMHG

## 2021-08-09 LAB
ANION GAP SERPL CALCULATED.3IONS-SCNC: 4 MMOL/L (ref 5–15)
BUN SERPL-MCNC: 34 MG/DL (ref 7–18)
BUN/CREAT SERPL: 34
CALCIUM SERPL-MCNC: 7.7 MG/DL (ref 8.5–10.1)
CHLORIDE SERPL-SCNC: 97 MMOL/L (ref 98–107)
CO2 SERPL-SCNC: 34 MMOL/L (ref 21–32)
GLUCOSE SERPL-MCNC: 344 MG/DL (ref 74–106)
HCT VFR BLD AUTO: 36.6 % (ref 41–53)
HGB BLD-MCNC: 12.3 G/DL (ref 13.5–17.5)
MAGNESIUM SERPL-MCNC: 1.8 MG/DL (ref 1.6–2.6)
MCH RBC QN AUTO: 30.8 PG (ref 28–32)
MCV RBC AUTO: 92 FL (ref 80–100)
POTASSIUM SERPL-SCNC: 4.5 MMOL/L (ref 3.5–5.1)
SODIUM SERPL-SCNC: 135 MMOL/L (ref 136–145)

## 2021-08-09 RX ADMIN — METOPROLOL TARTRATE PRN MG: 5 INJECTION, SOLUTION INTRAVENOUS at 20:22

## 2021-08-09 RX ADMIN — AMIODARONE HYDROCHLORIDE SCH MLS/HR: 50 INJECTION, SOLUTION INTRAVENOUS at 22:22

## 2021-08-09 RX ADMIN — ALBUTEROL SULFATE SCH MG: 2.5 SOLUTION RESPIRATORY (INHALATION) at 13:11

## 2021-08-09 RX ADMIN — AZITHROMYCIN DIHYDRATE SCH MLS/HR: 500 INJECTION, POWDER, LYOPHILIZED, FOR SOLUTION INTRAVENOUS at 11:11

## 2021-08-09 RX ADMIN — GABAPENTIN SCH MG: 100 CAPSULE ORAL at 21:28

## 2021-08-09 RX ADMIN — PIPERACILLIN SODIUM AND TAZOBACTAM SODIUM SCH MLS/HR: .375; 3 INJECTION, POWDER, LYOPHILIZED, FOR SOLUTION INTRAVENOUS at 23:26

## 2021-08-09 RX ADMIN — ALBUTEROL SULFATE SCH MG: 2.5 SOLUTION RESPIRATORY (INHALATION) at 02:20

## 2021-08-09 RX ADMIN — IPRATROPIUM BROMIDE SCH MG: 0.5 SOLUTION RESPIRATORY (INHALATION) at 02:20

## 2021-08-09 RX ADMIN — IPRATROPIUM BROMIDE SCH MG: 0.5 SOLUTION RESPIRATORY (INHALATION) at 18:43

## 2021-08-09 RX ADMIN — METHYLPREDNISOLONE SODIUM SUCCINATE SCH MG: 40 INJECTION, POWDER, FOR SOLUTION INTRAMUSCULAR; INTRAVENOUS at 23:26

## 2021-08-09 RX ADMIN — AMIODARONE HYDROCHLORIDE SCH MLS/HR: 50 INJECTION, SOLUTION INTRAVENOUS at 07:55

## 2021-08-09 RX ADMIN — IPRATROPIUM BROMIDE SCH MG: 0.5 SOLUTION RESPIRATORY (INHALATION) at 06:26

## 2021-08-09 RX ADMIN — PIPERACILLIN SODIUM AND TAZOBACTAM SODIUM SCH MLS/HR: .375; 3 INJECTION, POWDER, LYOPHILIZED, FOR SOLUTION INTRAVENOUS at 06:08

## 2021-08-09 RX ADMIN — ALBUTEROL SULFATE SCH MG: 2.5 SOLUTION RESPIRATORY (INHALATION) at 06:00

## 2021-08-09 RX ADMIN — BUDESONIDE SCH MG: 0.5 SUSPENSION RESPIRATORY (INHALATION) at 18:43

## 2021-08-09 RX ADMIN — PANTOPRAZOLE SODIUM SCH MG: 40 INJECTION, POWDER, FOR SOLUTION INTRAVENOUS at 10:07

## 2021-08-09 RX ADMIN — IPRATROPIUM BROMIDE SCH MG: 0.5 SOLUTION RESPIRATORY (INHALATION) at 13:11

## 2021-08-09 RX ADMIN — PIPERACILLIN SODIUM AND TAZOBACTAM SODIUM SCH MLS/HR: .375; 3 INJECTION, POWDER, LYOPHILIZED, FOR SOLUTION INTRAVENOUS at 14:20

## 2021-08-09 RX ADMIN — FUROSEMIDE SCH MG: 10 INJECTION, SOLUTION INTRAMUSCULAR; INTRAVENOUS at 11:12

## 2021-08-09 RX ADMIN — BUDESONIDE SCH MG: 0.5 SUSPENSION RESPIRATORY (INHALATION) at 06:26

## 2021-08-09 RX ADMIN — GABAPENTIN SCH MG: 100 CAPSULE ORAL at 06:08

## 2021-08-09 RX ADMIN — GABAPENTIN SCH MG: 100 CAPSULE ORAL at 14:20

## 2021-08-09 RX ADMIN — METHYLPREDNISOLONE SODIUM SUCCINATE SCH MG: 125 INJECTION, POWDER, FOR SOLUTION INTRAMUSCULAR; INTRAVENOUS at 10:07

## 2021-08-09 RX ADMIN — ALBUTEROL SULFATE SCH MG: 2.5 SOLUTION RESPIRATORY (INHALATION) at 18:00

## 2021-08-09 RX ADMIN — FUROSEMIDE SCH MG: 10 INJECTION, SOLUTION INTRAMUSCULAR; INTRAVENOUS at 10:00

## 2021-08-09 RX ADMIN — METOPROLOL TARTRATE PRN MG: 5 INJECTION, SOLUTION INTRAVENOUS at 13:29

## 2021-08-09 RX ADMIN — VANCOMYCIN HYDROCHLORIDE SCH MLS/HR: 1 INJECTION, POWDER, LYOPHILIZED, FOR SOLUTION INTRAVENOUS at 17:08

## 2021-08-09 RX ADMIN — METOPROLOL TARTRATE SCH MG: 25 TABLET, FILM COATED ORAL at 10:06

## 2021-08-09 RX ADMIN — METOPROLOL TARTRATE SCH MG: 25 TABLET, FILM COATED ORAL at 21:27

## 2021-08-09 RX ADMIN — ENOXAPARIN SODIUM SCH MG: 40 INJECTION SUBCUTANEOUS at 10:06

## 2021-08-10 VITALS — SYSTOLIC BLOOD PRESSURE: 143 MMHG | DIASTOLIC BLOOD PRESSURE: 97 MMHG

## 2021-08-10 VITALS — DIASTOLIC BLOOD PRESSURE: 78 MMHG | SYSTOLIC BLOOD PRESSURE: 148 MMHG

## 2021-08-10 VITALS — SYSTOLIC BLOOD PRESSURE: 131 MMHG | DIASTOLIC BLOOD PRESSURE: 73 MMHG

## 2021-08-10 VITALS — DIASTOLIC BLOOD PRESSURE: 90 MMHG | SYSTOLIC BLOOD PRESSURE: 150 MMHG

## 2021-08-10 VITALS — SYSTOLIC BLOOD PRESSURE: 107 MMHG | DIASTOLIC BLOOD PRESSURE: 55 MMHG

## 2021-08-10 LAB
ANION GAP SERPL CALCULATED.3IONS-SCNC: 2 MMOL/L (ref 5–15)
BUN SERPL-MCNC: 40 MG/DL (ref 7–18)
BUN/CREAT SERPL: 38.5
CALCIUM SERPL-MCNC: 8.2 MG/DL (ref 8.5–10.1)
CHLORIDE SERPL-SCNC: 97 MMOL/L (ref 98–107)
CO2 SERPL-SCNC: 36 MMOL/L (ref 21–32)
GLUCOSE SERPL-MCNC: 363 MG/DL (ref 74–106)
HCT VFR BLD AUTO: 37.6 % (ref 41–53)
HGB BLD-MCNC: 12.5 G/DL (ref 13.5–17.5)
MAGNESIUM SERPL-MCNC: 2.3 MG/DL (ref 1.6–2.6)
MCH RBC QN AUTO: 30.3 PG (ref 28–32)
MCV RBC AUTO: 91.3 FL (ref 80–100)
POTASSIUM SERPL-SCNC: 4.7 MMOL/L (ref 3.5–5.1)
SODIUM SERPL-SCNC: 135 MMOL/L (ref 136–145)

## 2021-08-10 RX ADMIN — GABAPENTIN SCH MG: 100 CAPSULE ORAL at 06:26

## 2021-08-10 RX ADMIN — ENOXAPARIN SODIUM SCH MG: 40 INJECTION SUBCUTANEOUS at 09:48

## 2021-08-10 RX ADMIN — METHYLPREDNISOLONE SODIUM SUCCINATE SCH MG: 40 INJECTION, POWDER, FOR SOLUTION INTRAMUSCULAR; INTRAVENOUS at 09:48

## 2021-08-10 RX ADMIN — ALBUTEROL SULFATE SCH MG: 2.5 SOLUTION RESPIRATORY (INHALATION) at 11:56

## 2021-08-10 RX ADMIN — VANCOMYCIN HYDROCHLORIDE SCH MLS/HR: 1 INJECTION, POWDER, LYOPHILIZED, FOR SOLUTION INTRAVENOUS at 16:28

## 2021-08-10 RX ADMIN — ALBUTEROL SULFATE SCH MG: 2.5 SOLUTION RESPIRATORY (INHALATION) at 19:04

## 2021-08-10 RX ADMIN — BUDESONIDE SCH MG: 0.5 SUSPENSION RESPIRATORY (INHALATION) at 06:43

## 2021-08-10 RX ADMIN — PIPERACILLIN SODIUM AND TAZOBACTAM SODIUM SCH MLS/HR: .375; 3 INJECTION, POWDER, LYOPHILIZED, FOR SOLUTION INTRAVENOUS at 22:28

## 2021-08-10 RX ADMIN — PANTOPRAZOLE SODIUM SCH MG: 40 INJECTION, POWDER, FOR SOLUTION INTRAVENOUS at 09:48

## 2021-08-10 RX ADMIN — ALBUTEROL SULFATE SCH MG: 2.5 SOLUTION RESPIRATORY (INHALATION) at 06:43

## 2021-08-10 RX ADMIN — VANCOMYCIN HYDROCHLORIDE SCH MLS/HR: 1 INJECTION, POWDER, LYOPHILIZED, FOR SOLUTION INTRAVENOUS at 04:58

## 2021-08-10 RX ADMIN — FUROSEMIDE SCH MG: 10 INJECTION, SOLUTION INTRAMUSCULAR; INTRAVENOUS at 09:49

## 2021-08-10 RX ADMIN — GABAPENTIN SCH MG: 100 CAPSULE ORAL at 16:27

## 2021-08-10 RX ADMIN — METOPROLOL TARTRATE SCH MG: 25 TABLET, FILM COATED ORAL at 09:49

## 2021-08-10 RX ADMIN — IPRATROPIUM BROMIDE SCH MG: 0.5 SOLUTION RESPIRATORY (INHALATION) at 19:04

## 2021-08-10 RX ADMIN — AZITHROMYCIN DIHYDRATE SCH MLS/HR: 500 INJECTION, POWDER, LYOPHILIZED, FOR SOLUTION INTRAVENOUS at 09:49

## 2021-08-10 RX ADMIN — IPRATROPIUM BROMIDE SCH MG: 0.5 SOLUTION RESPIRATORY (INHALATION) at 06:43

## 2021-08-10 RX ADMIN — PIPERACILLIN SODIUM AND TAZOBACTAM SODIUM SCH MLS/HR: .375; 3 INJECTION, POWDER, LYOPHILIZED, FOR SOLUTION INTRAVENOUS at 16:28

## 2021-08-10 RX ADMIN — METHYLPREDNISOLONE SODIUM SUCCINATE SCH MG: 40 INJECTION, POWDER, FOR SOLUTION INTRAMUSCULAR; INTRAVENOUS at 21:59

## 2021-08-10 RX ADMIN — BUDESONIDE SCH MG: 0.5 SUSPENSION RESPIRATORY (INHALATION) at 19:04

## 2021-08-10 RX ADMIN — PIPERACILLIN SODIUM AND TAZOBACTAM SODIUM SCH MLS/HR: .375; 3 INJECTION, POWDER, LYOPHILIZED, FOR SOLUTION INTRAVENOUS at 06:25

## 2021-08-10 RX ADMIN — METOPROLOL TARTRATE SCH MG: 25 TABLET, FILM COATED ORAL at 22:00

## 2021-08-10 RX ADMIN — AMIODARONE HYDROCHLORIDE SCH MG: 200 TABLET ORAL at 21:59

## 2021-08-10 RX ADMIN — IPRATROPIUM BROMIDE SCH MG: 0.5 SOLUTION RESPIRATORY (INHALATION) at 11:56

## 2021-08-10 RX ADMIN — GABAPENTIN SCH MG: 100 CAPSULE ORAL at 22:01

## 2021-08-11 VITALS — DIASTOLIC BLOOD PRESSURE: 58 MMHG | SYSTOLIC BLOOD PRESSURE: 126 MMHG

## 2021-08-11 VITALS — DIASTOLIC BLOOD PRESSURE: 84 MMHG | SYSTOLIC BLOOD PRESSURE: 137 MMHG

## 2021-08-11 VITALS — SYSTOLIC BLOOD PRESSURE: 115 MMHG | DIASTOLIC BLOOD PRESSURE: 77 MMHG

## 2021-08-11 RX ADMIN — BUDESONIDE SCH MG: 0.5 SUSPENSION RESPIRATORY (INHALATION) at 06:18

## 2021-08-11 RX ADMIN — ENOXAPARIN SODIUM SCH MG: 40 INJECTION SUBCUTANEOUS at 09:13

## 2021-08-11 RX ADMIN — PANTOPRAZOLE SODIUM SCH MG: 40 INJECTION, POWDER, FOR SOLUTION INTRAVENOUS at 09:13

## 2021-08-11 RX ADMIN — GABAPENTIN SCH MG: 100 CAPSULE ORAL at 06:00

## 2021-08-11 RX ADMIN — ALBUTEROL SULFATE SCH MG: 2.5 SOLUTION RESPIRATORY (INHALATION) at 11:35

## 2021-08-11 RX ADMIN — METHYLPREDNISOLONE SODIUM SUCCINATE SCH MG: 40 INJECTION, POWDER, FOR SOLUTION INTRAMUSCULAR; INTRAVENOUS at 09:13

## 2021-08-11 RX ADMIN — AMIODARONE HYDROCHLORIDE SCH MG: 200 TABLET ORAL at 09:11

## 2021-08-11 RX ADMIN — IPRATROPIUM BROMIDE SCH MG: 0.5 SOLUTION RESPIRATORY (INHALATION) at 06:18

## 2021-08-11 RX ADMIN — IPRATROPIUM BROMIDE SCH MG: 0.5 SOLUTION RESPIRATORY (INHALATION) at 11:35

## 2021-08-11 RX ADMIN — GABAPENTIN SCH MG: 100 CAPSULE ORAL at 14:52

## 2021-08-11 RX ADMIN — ALBUTEROL SULFATE SCH MG: 2.5 SOLUTION RESPIRATORY (INHALATION) at 06:18

## 2021-08-11 RX ADMIN — PIPERACILLIN SODIUM AND TAZOBACTAM SODIUM SCH MLS/HR: .375; 3 INJECTION, POWDER, LYOPHILIZED, FOR SOLUTION INTRAVENOUS at 05:23

## 2021-08-11 RX ADMIN — AZITHROMYCIN DIHYDRATE SCH MLS/HR: 500 INJECTION, POWDER, LYOPHILIZED, FOR SOLUTION INTRAVENOUS at 09:12

## 2021-08-11 RX ADMIN — METOPROLOL TARTRATE SCH MG: 25 TABLET, FILM COATED ORAL at 09:12

## 2021-08-11 RX ADMIN — FUROSEMIDE SCH MG: 10 INJECTION, SOLUTION INTRAMUSCULAR; INTRAVENOUS at 09:13

## 2021-08-11 RX ADMIN — VANCOMYCIN HYDROCHLORIDE SCH MLS/HR: 1 INJECTION, POWDER, LYOPHILIZED, FOR SOLUTION INTRAVENOUS at 06:57
